# Patient Record
Sex: FEMALE | Race: WHITE | NOT HISPANIC OR LATINO | Employment: OTHER | URBAN - NONMETROPOLITAN AREA
[De-identification: names, ages, dates, MRNs, and addresses within clinical notes are randomized per-mention and may not be internally consistent; named-entity substitution may affect disease eponyms.]

---

## 2021-01-21 ENCOUNTER — IMMUNIZATION (OUTPATIENT)
Dept: VACCINE CLINIC | Facility: HOSPITAL | Age: 76
End: 2021-01-21

## 2021-01-21 PROCEDURE — 0001A: CPT | Performed by: NURSE PRACTITIONER

## 2021-01-21 PROCEDURE — 91300 HC SARSCOV02 VAC 30MCG/0.3ML IM: CPT | Performed by: NURSE PRACTITIONER

## 2021-02-11 ENCOUNTER — IMMUNIZATION (OUTPATIENT)
Dept: VACCINE CLINIC | Facility: HOSPITAL | Age: 76
End: 2021-02-11

## 2021-02-11 PROCEDURE — 0002A: CPT | Performed by: THORACIC SURGERY (CARDIOTHORACIC VASCULAR SURGERY)

## 2021-02-11 PROCEDURE — 91300 HC SARSCOV02 VAC 30MCG/0.3ML IM: CPT | Performed by: THORACIC SURGERY (CARDIOTHORACIC VASCULAR SURGERY)

## 2023-03-11 ENCOUNTER — HOSPITAL ENCOUNTER (OUTPATIENT)
Facility: HOSPITAL | Age: 78
Setting detail: OBSERVATION
Discharge: HOME | End: 2023-03-13
Attending: STUDENT IN AN ORGANIZED HEALTH CARE EDUCATION/TRAINING PROGRAM | Admitting: FAMILY MEDICINE
Payer: MEDICARE

## 2023-03-11 ENCOUNTER — APPOINTMENT (OUTPATIENT)
Dept: GENERAL RADIOLOGY | Facility: HOSPITAL | Age: 78
End: 2023-03-11
Payer: MEDICARE

## 2023-03-11 DIAGNOSIS — Z78.9 IMPAIRED MOBILITY AND ADLS: ICD-10-CM

## 2023-03-11 DIAGNOSIS — Z74.09 IMPAIRED MOBILITY AND ADLS: ICD-10-CM

## 2023-03-11 DIAGNOSIS — Z74.09 IMPAIRED FUNCTIONAL MOBILITY, BALANCE, GAIT, AND ENDURANCE: ICD-10-CM

## 2023-03-11 DIAGNOSIS — R07.9 CHEST PAIN, UNSPECIFIED TYPE: Primary | ICD-10-CM

## 2023-03-11 LAB
ALBUMIN SERPL-MCNC: 3.9 G/DL (ref 3.5–5.2)
ALBUMIN/GLOB SERPL: 1.3 G/DL
ALP SERPL-CCNC: 76 U/L (ref 39–117)
ALT SERPL W P-5'-P-CCNC: 14 U/L (ref 1–33)
ANION GAP SERPL CALCULATED.3IONS-SCNC: 9 MMOL/L (ref 5–15)
AST SERPL-CCNC: 17 U/L (ref 1–32)
BASOPHILS # BLD AUTO: 0.03 10*3/MM3 (ref 0–0.2)
BASOPHILS NFR BLD AUTO: 0.4 % (ref 0–1.5)
BILIRUB SERPL-MCNC: 0.3 MG/DL (ref 0–1.2)
BUN SERPL-MCNC: 18 MG/DL (ref 8–23)
BUN/CREAT SERPL: 31 (ref 7–25)
CALCIUM SPEC-SCNC: 10 MG/DL (ref 8.6–10.5)
CHLORIDE SERPL-SCNC: 102 MMOL/L (ref 98–107)
CO2 SERPL-SCNC: 27 MMOL/L (ref 22–29)
CREAT SERPL-MCNC: 0.58 MG/DL (ref 0.57–1)
DEPRECATED RDW RBC AUTO: 41.2 FL (ref 37–54)
EGFRCR SERPLBLD CKD-EPI 2021: 92.2 ML/MIN/1.73
EOSINOPHIL # BLD AUTO: 0.04 10*3/MM3 (ref 0–0.4)
EOSINOPHIL NFR BLD AUTO: 0.5 % (ref 0.3–6.2)
ERYTHROCYTE [DISTWIDTH] IN BLOOD BY AUTOMATED COUNT: 12.6 % (ref 12.3–15.4)
GEN 5 2HR TROPONIN T REFLEX: 24 NG/L
GLOBULIN UR ELPH-MCNC: 3 GM/DL
GLUCOSE SERPL-MCNC: 92 MG/DL (ref 65–99)
HCT VFR BLD AUTO: 41 % (ref 34–46.6)
HGB BLD-MCNC: 13.3 G/DL (ref 12–15.9)
HOLD SPECIMEN: NORMAL
IMM GRANULOCYTES # BLD AUTO: 0.04 10*3/MM3 (ref 0–0.05)
IMM GRANULOCYTES NFR BLD AUTO: 0.5 % (ref 0–0.5)
LYMPHOCYTES # BLD AUTO: 1.27 10*3/MM3 (ref 0.7–3.1)
LYMPHOCYTES NFR BLD AUTO: 15.5 % (ref 19.6–45.3)
MCH RBC QN AUTO: 29.3 PG (ref 26.6–33)
MCHC RBC AUTO-ENTMCNC: 32.4 G/DL (ref 31.5–35.7)
MCV RBC AUTO: 90.3 FL (ref 79–97)
MONOCYTES # BLD AUTO: 0.71 10*3/MM3 (ref 0.1–0.9)
MONOCYTES NFR BLD AUTO: 8.6 % (ref 5–12)
NEUTROPHILS NFR BLD AUTO: 6.12 10*3/MM3 (ref 1.7–7)
NEUTROPHILS NFR BLD AUTO: 74.5 % (ref 42.7–76)
NRBC BLD AUTO-RTO: 0 /100 WBC (ref 0–0.2)
NT-PROBNP SERPL-MCNC: 1082 PG/ML (ref 0–1800)
PLATELET # BLD AUTO: 262 10*3/MM3 (ref 140–450)
PMV BLD AUTO: 10.7 FL (ref 6–12)
POTASSIUM SERPL-SCNC: 4.2 MMOL/L (ref 3.5–5.2)
PROT SERPL-MCNC: 6.9 G/DL (ref 6–8.5)
RBC # BLD AUTO: 4.54 10*6/MM3 (ref 3.77–5.28)
SODIUM SERPL-SCNC: 138 MMOL/L (ref 136–145)
TROPONIN T DELTA: -1 NG/L
TROPONIN T SERPL HS-MCNC: 25 NG/L
TSH SERPL DL<=0.05 MIU/L-ACNC: 0.02 UIU/ML (ref 0.27–4.2)
WBC NRBC COR # BLD: 8.21 10*3/MM3 (ref 3.4–10.8)
WHOLE BLOOD HOLD COAG: NORMAL
WHOLE BLOOD HOLD SPECIMEN: NORMAL
WHOLE BLOOD HOLD SPECIMEN: NORMAL

## 2023-03-11 PROCEDURE — 99284 EMERGENCY DEPT VISIT MOD MDM: CPT

## 2023-03-11 PROCEDURE — 71045 X-RAY EXAM CHEST 1 VIEW: CPT

## 2023-03-11 PROCEDURE — 25010000002 HEPARIN (PORCINE) PER 1000 UNITS: Performed by: HOSPITALIST

## 2023-03-11 PROCEDURE — G0378 HOSPITAL OBSERVATION PER HR: HCPCS

## 2023-03-11 PROCEDURE — 93010 ELECTROCARDIOGRAM REPORT: CPT | Performed by: INTERNAL MEDICINE

## 2023-03-11 PROCEDURE — 36415 COLL VENOUS BLD VENIPUNCTURE: CPT | Performed by: HOSPITALIST

## 2023-03-11 PROCEDURE — 93005 ELECTROCARDIOGRAM TRACING: CPT | Performed by: STUDENT IN AN ORGANIZED HEALTH CARE EDUCATION/TRAINING PROGRAM

## 2023-03-11 PROCEDURE — 84484 ASSAY OF TROPONIN QUANT: CPT | Performed by: HOSPITALIST

## 2023-03-11 PROCEDURE — 85025 COMPLETE CBC W/AUTO DIFF WBC: CPT | Performed by: STUDENT IN AN ORGANIZED HEALTH CARE EDUCATION/TRAINING PROGRAM

## 2023-03-11 PROCEDURE — 25010000002 HYDRALAZINE PER 20 MG: Performed by: STUDENT IN AN ORGANIZED HEALTH CARE EDUCATION/TRAINING PROGRAM

## 2023-03-11 PROCEDURE — 93005 ELECTROCARDIOGRAM TRACING: CPT | Performed by: HOSPITALIST

## 2023-03-11 PROCEDURE — 96374 THER/PROPH/DIAG INJ IV PUSH: CPT

## 2023-03-11 PROCEDURE — 93005 ELECTROCARDIOGRAM TRACING: CPT

## 2023-03-11 PROCEDURE — 96372 THER/PROPH/DIAG INJ SC/IM: CPT

## 2023-03-11 PROCEDURE — 80053 COMPREHEN METABOLIC PANEL: CPT | Performed by: STUDENT IN AN ORGANIZED HEALTH CARE EDUCATION/TRAINING PROGRAM

## 2023-03-11 PROCEDURE — 63710000001 PREDNISONE PER 1 MG: Performed by: HOSPITALIST

## 2023-03-11 PROCEDURE — 84443 ASSAY THYROID STIM HORMONE: CPT | Performed by: HOSPITALIST

## 2023-03-11 PROCEDURE — 84484 ASSAY OF TROPONIN QUANT: CPT | Performed by: STUDENT IN AN ORGANIZED HEALTH CARE EDUCATION/TRAINING PROGRAM

## 2023-03-11 PROCEDURE — 83880 ASSAY OF NATRIURETIC PEPTIDE: CPT | Performed by: STUDENT IN AN ORGANIZED HEALTH CARE EDUCATION/TRAINING PROGRAM

## 2023-03-11 RX ORDER — BISACODYL 5 MG/1
5 TABLET, DELAYED RELEASE ORAL DAILY PRN
Status: DISCONTINUED | OUTPATIENT
Start: 2023-03-11 | End: 2023-03-13 | Stop reason: HOSPADM

## 2023-03-11 RX ORDER — DIPHENHYDRAMINE HCL 50 MG
50 CAPSULE ORAL
Status: COMPLETED | OUTPATIENT
Start: 2023-03-12 | End: 2023-03-12

## 2023-03-11 RX ORDER — CALCIUM CARBONATE 200(500)MG
2 TABLET,CHEWABLE ORAL 2 TIMES DAILY PRN
Status: DISCONTINUED | OUTPATIENT
Start: 2023-03-11 | End: 2023-03-13 | Stop reason: HOSPADM

## 2023-03-11 RX ORDER — POLYETHYLENE GLYCOL 3350 17 G/17G
17 POWDER, FOR SOLUTION ORAL DAILY PRN
Status: DISCONTINUED | OUTPATIENT
Start: 2023-03-11 | End: 2023-03-13 | Stop reason: HOSPADM

## 2023-03-11 RX ORDER — NALOXONE HCL 0.4 MG/ML
0.4 VIAL (ML) INJECTION
Status: DISCONTINUED | OUTPATIENT
Start: 2023-03-11 | End: 2023-03-13 | Stop reason: HOSPADM

## 2023-03-11 RX ORDER — LISINOPRIL 20 MG/1
20 TABLET ORAL
Status: DISCONTINUED | OUTPATIENT
Start: 2023-03-11 | End: 2023-03-13 | Stop reason: HOSPADM

## 2023-03-11 RX ORDER — DIPHENHYDRAMINE HYDROCHLORIDE 50 MG/ML
50 INJECTION INTRAMUSCULAR; INTRAVENOUS
Status: COMPLETED | OUTPATIENT
Start: 2023-03-12 | End: 2023-03-12

## 2023-03-11 RX ORDER — ASPIRIN 81 MG/1
81 TABLET ORAL DAILY
Status: DISCONTINUED | OUTPATIENT
Start: 2023-03-12 | End: 2023-03-13 | Stop reason: HOSPADM

## 2023-03-11 RX ORDER — LORAZEPAM 0.5 MG/1
0.5 TABLET ORAL EVERY 8 HOURS PRN
Status: DISCONTINUED | OUTPATIENT
Start: 2023-03-11 | End: 2023-03-13 | Stop reason: HOSPADM

## 2023-03-11 RX ORDER — MORPHINE SULFATE 2 MG/ML
1 INJECTION, SOLUTION INTRAMUSCULAR; INTRAVENOUS EVERY 4 HOURS PRN
Status: DISCONTINUED | OUTPATIENT
Start: 2023-03-11 | End: 2023-03-13 | Stop reason: HOSPADM

## 2023-03-11 RX ORDER — BISACODYL 10 MG
10 SUPPOSITORY, RECTAL RECTAL DAILY PRN
Status: DISCONTINUED | OUTPATIENT
Start: 2023-03-11 | End: 2023-03-13 | Stop reason: HOSPADM

## 2023-03-11 RX ORDER — ASPIRIN 81 MG/1
81 TABLET, CHEWABLE ORAL ONCE
Status: COMPLETED | OUTPATIENT
Start: 2023-03-11 | End: 2023-03-11

## 2023-03-11 RX ORDER — ONDANSETRON 2 MG/ML
4 INJECTION INTRAMUSCULAR; INTRAVENOUS EVERY 6 HOURS PRN
Status: DISCONTINUED | OUTPATIENT
Start: 2023-03-11 | End: 2023-03-13 | Stop reason: HOSPADM

## 2023-03-11 RX ORDER — SODIUM CHLORIDE 0.9 % (FLUSH) 0.9 %
10 SYRINGE (ML) INJECTION EVERY 12 HOURS SCHEDULED
Status: DISCONTINUED | OUTPATIENT
Start: 2023-03-11 | End: 2023-03-13 | Stop reason: HOSPADM

## 2023-03-11 RX ORDER — SODIUM CHLORIDE 9 MG/ML
40 INJECTION, SOLUTION INTRAVENOUS AS NEEDED
Status: DISCONTINUED | OUTPATIENT
Start: 2023-03-11 | End: 2023-03-13 | Stop reason: HOSPADM

## 2023-03-11 RX ORDER — HYDRALAZINE HYDROCHLORIDE 20 MG/ML
20 INJECTION INTRAMUSCULAR; INTRAVENOUS ONCE
Status: COMPLETED | OUTPATIENT
Start: 2023-03-11 | End: 2023-03-11

## 2023-03-11 RX ORDER — SODIUM CHLORIDE 9 MG/ML
75 INJECTION, SOLUTION INTRAVENOUS CONTINUOUS
Status: DISCONTINUED | OUTPATIENT
Start: 2023-03-11 | End: 2023-03-13 | Stop reason: HOSPADM

## 2023-03-11 RX ORDER — NITROGLYCERIN 0.4 MG/1
0.4 TABLET SUBLINGUAL
Status: DISCONTINUED | OUTPATIENT
Start: 2023-03-11 | End: 2023-03-13 | Stop reason: HOSPADM

## 2023-03-11 RX ORDER — METOPROLOL TARTRATE 50 MG/1
50 TABLET, FILM COATED ORAL EVERY 12 HOURS SCHEDULED
Status: DISCONTINUED | OUTPATIENT
Start: 2023-03-11 | End: 2023-03-13 | Stop reason: HOSPADM

## 2023-03-11 RX ORDER — CHOLECALCIFEROL (VITAMIN D3) 125 MCG
5 CAPSULE ORAL NIGHTLY PRN
Status: DISCONTINUED | OUTPATIENT
Start: 2023-03-11 | End: 2023-03-13 | Stop reason: HOSPADM

## 2023-03-11 RX ORDER — ONDANSETRON 4 MG/1
4 TABLET, FILM COATED ORAL EVERY 6 HOURS PRN
Status: DISCONTINUED | OUTPATIENT
Start: 2023-03-11 | End: 2023-03-13 | Stop reason: HOSPADM

## 2023-03-11 RX ORDER — SODIUM CHLORIDE 0.9 % (FLUSH) 0.9 %
10 SYRINGE (ML) INJECTION AS NEEDED
Status: DISCONTINUED | OUTPATIENT
Start: 2023-03-11 | End: 2023-03-13 | Stop reason: HOSPADM

## 2023-03-11 RX ORDER — LISINOPRIL 20 MG/1
20 TABLET ORAL DAILY
COMMUNITY
End: 2023-03-21 | Stop reason: SDUPTHER

## 2023-03-11 RX ORDER — HEPARIN SODIUM 5000 [USP'U]/ML
5000 INJECTION, SOLUTION INTRAVENOUS; SUBCUTANEOUS EVERY 8 HOURS SCHEDULED
Status: DISCONTINUED | OUTPATIENT
Start: 2023-03-11 | End: 2023-03-13 | Stop reason: HOSPADM

## 2023-03-11 RX ORDER — ACETAMINOPHEN 325 MG/1
650 TABLET ORAL EVERY 4 HOURS PRN
Status: DISCONTINUED | OUTPATIENT
Start: 2023-03-11 | End: 2023-03-13 | Stop reason: HOSPADM

## 2023-03-11 RX ADMIN — HEPARIN SODIUM 5000 UNITS: 5000 INJECTION INTRAVENOUS; SUBCUTANEOUS at 15:42

## 2023-03-11 RX ADMIN — PREDNISONE 50 MG: 20 TABLET ORAL at 20:06

## 2023-03-11 RX ADMIN — HYDRALAZINE HYDROCHLORIDE 20 MG: 20 INJECTION INTRAMUSCULAR; INTRAVENOUS at 12:47

## 2023-03-11 RX ADMIN — ASPIRIN 81 MG: 81 TABLET, CHEWABLE ORAL at 15:42

## 2023-03-11 RX ADMIN — LISINOPRIL 20 MG: 20 TABLET ORAL at 14:39

## 2023-03-11 RX ADMIN — SODIUM CHLORIDE 75 ML/HR: 9 INJECTION, SOLUTION INTRAVENOUS at 15:42

## 2023-03-11 RX ADMIN — Medication 10 ML: at 20:07

## 2023-03-11 RX ADMIN — METOPROLOL TARTRATE 50 MG: 50 TABLET, FILM COATED ORAL at 20:06

## 2023-03-11 NOTE — NURSING NOTE
Notified CT of iodine rash and they recommended the 13 hour premedication or to cancel test. Dr. Lange notified.

## 2023-03-11 NOTE — H&P
[facility] Medicine  HISTORY AND PHYSICAL      Date of Admission: 3/11/2023  Primary Care Physician: Provider, No Known    Subjective     Chief Complaint:  Chest pain    History of Present Illness  The patient she is a 77-year-old female admitted to the hospital via the ER.  The patient comes in on day of admission complaining of substernal chest pain and pressure.  She states the issue started last night around midnight became progressively worse throughout the night.  She took Tums which did not help.  She continued to have pain when she awoke in the morning took 2 aspirin and came to the ER for evaluation.  She states over the last few days that she has been having some heart palpitations on exertion.  Become short of breath as well this could be related to deconditioning however patient is unsure.  She has had no diaphoresis.  No radiation of the pain to the jaw or to the upper extremity.  She has no fevers chills no headache.  No nausea vomiting no GI or urinary complaints.    Review of Systems   Constitutional: Positive for fatigue. Negative for chills and fever.   HENT: Negative.  Negative for congestion.    Eyes: Negative.    Respiratory: Negative.  Negative for cough and shortness of breath.    Cardiovascular: Positive for chest pain. Negative for palpitations.   Gastrointestinal: Negative.  Negative for abdominal distention, abdominal pain, nausea and vomiting.   Endocrine: Negative.    Genitourinary: Negative.    Musculoskeletal: Negative.    Skin: Negative.    Neurological: Positive for weakness. Negative for dizziness.   Hematological: Negative.    Psychiatric/Behavioral: Negative.         Otherwise complete ROS reviewed and negative except as mentioned in the HPI.    Past Medical History: No past medical history on file.  Past Surgical History:No past surgical history on file.  Social History:  No known tobacco alcohol or drug use.    Family History:  "family history is not on file.   Patient denies any related family history    Allergies:  No Known Allergies    Medications:  No medications prior to admission.    I have utilized all available immediate resources to obtain, update, and review the patient's current medications.    Objective     Vital Signs: /77   Pulse 66   Temp 97.4 °F (36.3 °C) (Oral)   Resp 19   Ht 170.2 cm (67\")   Wt 93.4 kg (206 lb)   SpO2 97%   BMI 32.26 kg/m²   Physical Exam  Vitals and nursing note reviewed.   Constitutional:       Appearance: Normal appearance.   HENT:      Head: Normocephalic and atraumatic.      Right Ear: External ear normal.      Left Ear: External ear normal.      Nose: Nose normal.      Mouth/Throat:      Mouth: Mucous membranes are moist.      Pharynx: Oropharynx is clear.   Eyes:      General: No scleral icterus.     Extraocular Movements: Extraocular movements intact.      Conjunctiva/sclera: Conjunctivae normal.      Pupils: Pupils are equal, round, and reactive to light.   Cardiovascular:      Rate and Rhythm: Normal rate and regular rhythm.      Heart sounds: No murmur heard.  Pulmonary:      Effort: Pulmonary effort is normal.      Breath sounds: Normal breath sounds.   Abdominal:      General: Bowel sounds are normal.      Palpations: Abdomen is soft.   Musculoskeletal:         General: Normal range of motion.      Cervical back: Normal range of motion and neck supple.   Skin:     General: Skin is warm and dry.      Capillary Refill: Capillary refill takes less than 2 seconds.   Neurological:      General: No focal deficit present.      Mental Status: She is alert and oriented to person, place, and time.   Psychiatric:         Mood and Affect: Mood normal.         Behavior: Behavior normal.              Results Reviewed:  Lab Results (last 24 hours)     Procedure Component Value Units Date/Time    [place] Draw [[id number]] Collected: 03/11/23 1241    Specimen: Blood Updated: 03/11/23 1345    " Narrative:      The following orders were created for panel order [place] Draw.  Procedure                               Abnormality         Status                     ---------                               -----------         ------                     Green Top (Gel)[[id number]]                                  In process                 Lavender Top[[id number]]                                     Final result               Gold Top - SST[[id number]]                                   In process                 Light Blue Top[[id number]]                                   In process                   Please view results for these tests on the individual orders.    Lavender Top [[id number]] Collected: 03/11/23 1241    Specimen: Blood Updated: 03/11/23 1345     Extra Tube hold for add-on     Comment: Auto resulted       Comprehensive Metabolic Panel [[id number]]  (Abnormal) Collected: 03/11/23 1305    Specimen: Blood Updated: 03/11/23 1344     Glucose 92 mg/dL      BUN 18 mg/dL      Creatinine 0.58 mg/dL      Sodium 138 mmol/L      Potassium 4.2 mmol/L      Chloride 102 mmol/L      CO2 27.0 mmol/L      Calcium 10.0 mg/dL      Total Protein 6.9 g/dL      Albumin 3.9 g/dL      ALT (SGPT) 14 U/L      AST (SGOT) 17 U/L      Alkaline Phosphatase 76 U/L      Total Bilirubin 0.3 mg/dL      Globulin 3.0 gm/dL      A/G Ratio 1.3 g/dL      BUN/Creatinine Ratio 31.0     Anion Gap 9.0 mmol/L      eGFR 92.2 mL/min/1.73     Narrative:      GFR Normal >60  Chronic Kidney Disease <60  Kidney Failure <15    The GFR formula is only valid for adults with stable renal function between ages 18 and 70.    High Sensitivity Troponin T [[id number]]  (Abnormal) Collected: 03/11/23 1305    Specimen: Blood Updated: 03/11/23 1344     HS Troponin T 25 ng/L     Narrative:      High Sensitive Troponin T Reference Range:  <10.0 ng/L- Negative Female for AMI  <15.0 ng/L- Negative Male for AMI  >=10 - Abnormal Female indicating possible myocardial  injury.  >=15 - Abnormal Male indicating possible myocardial injury.   Clinicians would have to utilize clinical acumen, EKG, Troponin, and serial changes to determine if it is an Acute Myocardial Infarction or myocardial injury due to an underlying chronic condition.         BNP [[id number]]  (Normal) Collected: 03/11/23 1305    Specimen: Blood Updated: 03/11/23 1342     proBNP 1,082.0 pg/mL     Narrative:      Among patients with dyspnea, NT-proBNP is highly sensitive for the detection of acute congestive heart failure. In addition NT-proBNP of <300 pg/ml effectively rules out acute congestive heart failure with 99% negative predictive value.      Extra Tubes [[id number]] Collected: 03/11/23 1305    Specimen: Blood, Venous Line Updated: 03/11/23 1324    Narrative:      The following orders were created for panel order Extra Tubes.  Procedure                               Abnormality         Status                     ---------                               -----------         ------                     Lavender Top[[id number]]                                     In process                 [place] Top[[id number]]                                         In process                   Please view results for these tests on the individual orders.    Lavender Top [[id number]] Collected: 03/11/23 1305    Specimen: Blood Updated: 03/11/23 1324    Gray Top [[id number]] Collected: 03/11/23 1305    Specimen: Blood Updated: 03/11/23 1324    Green Top (Gel) [[id number]] Collected: 03/11/23 1305    Specimen: Blood Updated: 03/11/23 1321    Gold Top - SST [[id number]] Collected: 03/11/23 1305    Specimen: Blood Updated: 03/11/23 1321    Light Blue Top [[id number]] Collected: 03/11/23 1305    Specimen: Blood Updated: 03/11/23 1321    CBC & Differential [[id number]]  (Abnormal) Collected: 03/11/23 1241    Specimen: Blood Updated: 03/11/23 1249    Narrative:      The following orders were created for panel order CBC & Differential.  Procedure                                Abnormality         Status                     ---------                               -----------         ------                     CBC Auto Differential[[id number]]        Abnormal            Final result                 Please view results for these tests on the individual orders.    CBC Auto Differential [[id number]]  (Abnormal) Collected: 03/11/23 1241    Specimen: Blood Updated: 03/11/23 1249     WBC 8.21 10*3/mm3      RBC 4.54 10*6/mm3      Hemoglobin 13.3 g/dL      Hematocrit 41.0 %      MCV 90.3 fL      MCH 29.3 pg      MCHC 32.4 g/dL      RDW 12.6 %      RDW-SD 41.2 fl      MPV 10.7 fL      Platelets 262 10*3/mm3      Neutrophil % 74.5 %      Lymphocyte % 15.5 %      Monocyte % 8.6 %      Eosinophil % 0.5 %      Basophil % 0.4 %      Immature Grans % 0.5 %      Neutrophils, Absolute 6.12 10*3/mm3      Lymphocytes, Absolute 1.27 10*3/mm3      Monocytes, Absolute 0.71 10*3/mm3      Eosinophils, Absolute 0.04 10*3/mm3      Basophils, Absolute 0.03 10*3/mm3      Immature Grans, Absolute 0.04 10*3/mm3      nRBC 0.0 /100 WBC         Imaging Results (Last 24 Hours)     Procedure Component Value Units Date/Time    XR Chest 1 View [[id number]] Collected: 03/11/23 1239     Updated: 03/11/23 1328    Narrative:      EXAM: XR CHEST 1 VIEW    HISTORY: Chest Pain triage protocol  Chest Pain Triage Protocol    COMPARISON:    TECHNIQUE:   Portable view of the chest.    FINDINGS:   The lungs are well aerated. There is no pleural effusion. The  heart size is prominent. The mediastinal contours are within  normal limits. .  No evidence of focal consolidation. Unremarkable bronchovascular  markings. Unremarkable bilateral hemidiaphragms.  Unremarkable visualized osseous structures.      Impression:      Cardiomegaly.  No gross pneumonia or CHF.          Electronically signed by:  Medardo Cramer MD  3/11/2023 1:26 PM [facility]  Workstation: [id number]        I have personally reviewed and interpreted the  radiology studies and ECG obtained at time of admission.     Assessment / Plan     Assessment:   Active Hospital Problems    Diagnosis    • **Chest pain, unspecified type      Impression/plan  Chest pain  Follow-up CT of the coronary arteries ordered stat  Troponin is 25 we will follow repeat  Paced the patient on beta-blocker, aspirin and as needed nitroglycerin  Diet cardiac  We will place cardiology consult if CT scan is positive.  We will check lipid panel and TSH    Uncontrolled hypertension  Patient takes lisinopril at home kidney function is good we will continue BUN is 18 creatinine 0.5 and GFR is 92.2  We will add beta-blocker Lopressor 50 mg twice a day  Hydralazine as needed for uncontrolled pressure.    Morbid obesity  Continue nutritional support  BMI is 32.2    Deconditioning  PT OT eval and treat    DVT prophylaxis heparin    CODE STATUS full code    Medical Decision Making  Number and Complexity of problems: 3 complex problems  Differential Diagnosis: Acute coronary syndrome versus non-ST segment elevation MI     MDM Data  External documents reviewed: Hospital chart  My EKG interpretation: EKG completed 3/11/2023  Normal sinus rhythm  Normal ECG  My plain film interpretation:  Chest x-ray completed 3/11/2023 showing cardiomegaly otherwise no acute cardiopulmonary process  Tests considered but not ordered: None     Decision rules/scores evaluated (example [id number], [place], etc):      Discussed with: The patient and her  and ER physician and agree with current treatment plan     Treatment Plan  As above    Care Planning  Shared decision making: Patient updated on current status and informed of proposed care plan; is in agreement with plan  Code status and discussions: Full code    Disposition  Social Determinants of Health that impact treatment or disposition: N/A  I expect the patient to be discharged to home in 1 to 2 days    I confirmed that the patient's Advance Care Plan is present, code  status is documented, or surrogate decision maker is listed in the patient's medical record.       The patient's surrogate decision maker is     I discussed my findings and recommendations with the patient and her  who is at bedside.    Estimated length of stay is 24 to 48 hours.     The patient was seen and examined by me on 3/11/2023 at 1:50 PM    Electronically signed by Ziggy Mckeon DO, 03/11/23, 14:07 CST.

## 2023-03-11 NOTE — ED NOTES
"Nursing report ED to floor  Larisa Vazquez  77 y.o.  female    HPI:   Chief Complaint   Patient presents with    Chest Pain       Admitting doctor:   Ziggy Mckeon DO    Consulting provider(s):  Consults       No orders found from 2/10/2023 to 3/12/2023.             Admitting diagnosis:   The encounter diagnosis was Chest pain, unspecified type.    Code status:   Current Code Status       Date Active Code Status Order ID Comments User Context       3/11/2023 1407 CPR (Attempt to Resuscitate) [id number]  Ziggy Mckeon DO ED        Question Answer    Code Status (Patient has no pulse and is not breathing) CPR (Attempt to Resuscitate)    Medical Interventions (Patient has pulse or is breathing) Full Support    Level Of Support Discussed With Patient     Next of Kin (If No Surrogate)                    Allergies:   Patient has no known allergies.    Intake and Output  No intake or output data in the 24 hours ending 03/11/23 1414    Weight:       03/11/23  1205   Weight: 93.4 kg (206 lb)       Most recent vitals:   Vitals:    03/11/23 1205 03/11/23 1245 03/11/23 1412   BP: (!) 209/100 180/77 164/70   BP Location: Right arm     Patient Position: Sitting     Pulse: 91 66 93   Resp: 20 19 18   Temp: 97.4 °F (36.3 °C)     TempSrc: Oral     SpO2: 95% 97% 99%   Weight: 93.4 kg (206 lb)     Height: 170.2 cm (67\")       Oxygen Therapy: No    Active LDAs/IV Access:   Lines, Drains & Airways       Active LDAs       Name Placement date Placement time Site Days    Peripheral IV 03/11/23 1241 Posterior;Right Wrist 03/11/23  1241  Wrist  less than 1                    Labs (abnormal labs have a star):   Labs Reviewed   TROPONIN - Abnormal; Notable for the following components:       Result Value    HS Troponin T 25 (*)     All other components within normal limits    Narrative:     High Sensitive Troponin T Reference Range:  <10.0 ng/L- Negative Female for AMI  <15.0 ng/L- Negative Male for AMI  >=10 - Abnormal Female " indicating possible myocardial injury.  >=15 - Abnormal Male indicating possible myocardial injury.   Clinicians would have to utilize clinical acumen, EKG, Troponin, and serial changes to determine if it is an Acute Myocardial Infarction or myocardial injury due to an underlying chronic condition.        COMPREHENSIVE METABOLIC PANEL - Abnormal; Notable for the following components:    BUN/Creatinine Ratio 31.0 (*)     All other components within normal limits    Narrative:     GFR Normal >60  Chronic Kidney Disease <60  Kidney Failure <15    The GFR formula is only valid for adults with stable renal function between ages 18 and 70.   CBC WITH AUTO DIFFERENTIAL - Abnormal; Notable for the following components:    Lymphocyte % 15.5 (*)     All other components within normal limits   BNP (IN-HOUSE) - Normal    Narrative:     Among patients with dyspnea, NT-proBNP is highly sensitive for the detection of acute congestive heart failure. In addition NT-proBNP of <300 pg/ml effectively rules out acute congestive heart failure with 99% negative predictive value.     [place] DRAW    Narrative:     The following orders were created for panel order [place] Draw.  Procedure                               Abnormality         Status                     ---------                               -----------         ------                     Green Top (Gel)[[id number]]                                  In process                 Lavender Top[[id number]]                                     Final result               Gold Top - SST[[id number]]                                   In process                 Light Blue Top[[id number]]                                   In process                   Please view results for these tests on the individual orders.   HIGH SENSITIVITIY TROPONIN T 2HR   CBC AND DIFFERENTIAL    Narrative:     The following orders were created for panel order CBC & Differential.  Procedure                                Abnormality         Status                     ---------                               -----------         ------                     CBC Auto Differential[[id number]]        Abnormal            Final result                 Please view results for these tests on the individual orders.   LAVENDER TOP   GREEN TOP   GOLD TOP - SST   LIGHT BLUE TOP   EXTRA TUBES    Narrative:     The following orders were created for panel order Extra Tubes.  Procedure                               Abnormality         Status                     ---------                               -----------         ------                     Lavender Top[[id number]]                                     In process                 [place] Top[[id number]]                                         In process                   Please view results for these tests on the individual orders.   LAVENDER TOP   GRAY TOP       Meds given in ED:   Medications   sodium chloride 0.9 % flush 10 mL (has no administration in time range)   nitroglycerin (NITROSTAT) SL tablet 0.4 mg (has no administration in time range)   hydrALAZINE (APRESOLINE) injection 20 mg (20 mg Intravenous Given 3/11/23 1247)           NIH Stroke Scale:       Isolation/Infection(s):  No active isolations   No active infections     COVID Testing  Collected No  Resulted NA     Nursing report ED to floor:  Mental status: AOx4  Ambulatory status: Self  Precautions: None    ED nurse phone extentsinp- [phone]

## 2023-03-11 NOTE — ED NOTES
Pt presenting from [facility]. C/o midsternal chest pressure that began last night that is radiating to her left arm.

## 2023-03-11 NOTE — ED PROVIDER NOTES
"Subjective   History of Present Illness  77-year-old female with history of hypertension comes to the ER with substernal chest pressure since yesterday.  Slow but worse with exertion and better with rest.  She denies other symptoms.  She took aspirin.  Pain has gotten somewhat better.    History provided by:  Patient   used: No        Review of Systems   Constitutional: Negative for chills and fever.   HENT: Negative for drooling.    Eyes: Negative for redness.   Respiratory: Negative for shortness of breath.    Cardiovascular: Positive for chest pain.   Gastrointestinal: Negative for abdominal pain, nausea and vomiting.   Genitourinary: Negative for flank pain.   Skin: Negative for color change.   Neurological: Negative for seizures.   Psychiatric/Behavioral: Negative for confusion.       No past medical history on file.    No Known Allergies    No past surgical history on file.    No family history on file.    Social History     Socioeconomic History   • Marital status:            Objective    Vitals:    03/11/23 1205 03/11/23 1245   BP: (!) 209/100 180/77   BP Location: Right arm    Patient Position: Sitting    Pulse: 91 66   Resp: 20 19   Temp: 97.4 °F (36.3 °C)    TempSrc: Oral    SpO2: 95% 97%   Weight: 93.4 kg (206 lb)    Height: 170.2 cm (67\")        Physical Exam  Vitals and nursing note reviewed.   Constitutional:       General: She is not in acute distress.     Appearance: She is well-developed. She is obese. She is not ill-appearing, toxic-appearing or diaphoretic.   Eyes:      Conjunctiva/sclera: Conjunctivae normal.   Pulmonary:      Effort: Pulmonary effort is normal. No accessory muscle usage or respiratory distress.      Breath sounds: Normal breath sounds.   Chest:      Chest wall: No tenderness.   Abdominal:      General: Bowel sounds are normal.      Palpations: Abdomen is soft.      Tenderness: There is no abdominal tenderness (deep palpation). There is no guarding or " rebound.   Skin:     General: Skin is warm and dry.      Capillary Refill: Capillary refill takes less than 2 seconds.   Neurological:      Mental Status: She is alert and oriented to person, place, and time.         ECG 12 Lead      Date/Time: 3/11/2023 2:00 PM  Performed by: Giuliano Benson MD  Authorized by: Giuliano Benson MD   Interpreted by physician  Rhythm: sinus rhythm  Rate: normal  QRS axis: normal  ST Segments: ST segments normal  T Waves: T waves normal  Clinical impression: normal ECG                 ED Course      Results for orders placed or performed during the hospital encounter of 03/11/23   High Sensitivity Troponin T    Specimen: Blood   Result Value Ref Range    HS Troponin T 25 (H) <10 ng/L   Comprehensive Metabolic Panel    Specimen: Blood   Result Value Ref Range    Glucose 92 65 - 99 mg/dL    BUN 18 8 - 23 mg/dL    Creatinine 0.58 0.57 - 1.00 mg/dL    Sodium 138 136 - 145 mmol/L    Potassium 4.2 3.5 - 5.2 mmol/L    Chloride 102 98 - 107 mmol/L    CO2 27.0 22.0 - 29.0 mmol/L    Calcium 10.0 8.6 - 10.5 mg/dL    Total Protein 6.9 6.0 - 8.5 g/dL    Albumin 3.9 3.5 - 5.2 g/dL    ALT (SGPT) 14 1 - 33 U/L    AST (SGOT) 17 1 - 32 U/L    Alkaline Phosphatase 76 39 - 117 U/L    Total Bilirubin 0.3 0.0 - 1.2 mg/dL    Globulin 3.0 gm/dL    A/G Ratio 1.3 g/dL    BUN/Creatinine Ratio 31.0 (H) 7.0 - 25.0    Anion Gap 9.0 5.0 - 15.0 mmol/L    eGFR 92.2 >60.0 mL/min/1.73   BNP    Specimen: Blood   Result Value Ref Range    proBNP 1,082.0 0.0 - 1,800.0 pg/mL   CBC Auto Differential    Specimen: Blood   Result Value Ref Range    WBC 8.21 3.40 - 10.80 10*3/mm3    RBC 4.54 3.77 - 5.28 10*6/mm3    Hemoglobin 13.3 12.0 - 15.9 g/dL    Hematocrit 41.0 34.0 - 46.6 %    MCV 90.3 79.0 - 97.0 fL    MCH 29.3 26.6 - 33.0 pg    MCHC 32.4 31.5 - 35.7 g/dL    RDW 12.6 12.3 - 15.4 %    RDW-SD 41.2 37.0 - 54.0 fl    MPV 10.7 6.0 - 12.0 fL    Platelets 262 140 - 450 10*3/mm3    Neutrophil % 74.5 42.7 - 76.0 %    Lymphocyte  % 15.5 (L) 19.6 - 45.3 %    Monocyte % 8.6 5.0 - 12.0 %    Eosinophil % 0.5 0.3 - 6.2 %    Basophil % 0.4 0.0 - 1.5 %    Immature Grans % 0.5 0.0 - 0.5 %    Neutrophils, Absolute 6.12 1.70 - 7.00 10*3/mm3    Lymphocytes, Absolute 1.27 0.70 - 3.10 10*3/mm3    Monocytes, Absolute 0.71 0.10 - 0.90 10*3/mm3    Eosinophils, Absolute 0.04 0.00 - 0.40 10*3/mm3    Basophils, Absolute 0.03 0.00 - 0.20 10*3/mm3    Immature Grans, Absolute 0.04 0.00 - 0.05 10*3/mm3    nRBC 0.0 0.0 - 0.2 /100 WBC   ECG 12 Lead Chest Pain   Result Value Ref Range    QT Interval 356 ms    QTC Interval 386 ms   Lavender Top   Result Value Ref Range    Extra Tube hold for add-on      XR Chest 1 View   Final Result   Cardiomegaly.   No gross pneumonia or CHF.               Electronically signed by:  Medardo Cramer MD  3/11/2023 1:26 PM CST   Workstation: [id number]             HEART Score for Major Cardiac Events - MDCalc  5 points -> Moderate Score (4-6 points) Risk of MACE of 12-16.6%.  If troponin is positive, many experts recommend further workup and admission even with a low HEART Score.    Medical Decision Making  Vital signs are stable, afebrile.  Labs remarkable for an elevated troponin.  EKG is sinus rhythm no acute ischemic changes.  Chest x-ray shows large heart with no overt acute findings.  Patient does have an elevated heart score.  Spoke with the on-call hospitalist who agrees to admit.    Chest pain, unspecified type: complicated acute illness or injury with systemic symptoms  Amount and/or Complexity of Data Reviewed  Labs: ordered.  Radiology: ordered.  ECG/medicine tests: ordered and independent interpretation performed.      Risk  Prescription drug management.  Decision regarding hospitalization.          Final diagnoses:   Chest pain, unspecified type       ED Disposition  ED Disposition     ED Disposition   Decision to Admit    Condition   --    Comment   Level of Care: Telemetry [5]   Diagnosis: Chest pain, unspecified type  [[id number]]   Admitting Physician: BEBETO MITCHELL [[id number]]   Attending Physician: BEBETO MITCHELL [[id number]]               No follow-up provider specified.       Medication List      No changes were made to your prescriptions during this visit.          Giuliano Benson MD  03/11/23 1409

## 2023-03-11 NOTE — PLAN OF CARE
Goal Outcome Evaluation:  Plan of Care Reviewed With: patient            No chest pain or soa at this time. Iodine allergy, 13 hour premedication ordered

## 2023-03-12 ENCOUNTER — APPOINTMENT (OUTPATIENT)
Dept: CT IMAGING | Facility: HOSPITAL | Age: 78
End: 2023-03-12
Payer: MEDICARE

## 2023-03-12 LAB
ALBUMIN SERPL-MCNC: 3.8 G/DL (ref 3.5–5.2)
ALBUMIN/GLOB SERPL: 1.4 G/DL
ALP SERPL-CCNC: 72 U/L (ref 39–117)
ALT SERPL W P-5'-P-CCNC: 24 U/L (ref 1–33)
ANION GAP SERPL CALCULATED.3IONS-SCNC: 11 MMOL/L (ref 5–15)
AST SERPL-CCNC: 24 U/L (ref 1–32)
BASOPHILS # BLD AUTO: 0.02 10*3/MM3 (ref 0–0.2)
BASOPHILS NFR BLD AUTO: 0.3 % (ref 0–1.5)
BILIRUB SERPL-MCNC: 0.3 MG/DL (ref 0–1.2)
BUN SERPL-MCNC: 20 MG/DL (ref 8–23)
BUN/CREAT SERPL: 37.7 (ref 7–25)
CALCIUM SPEC-SCNC: 9 MG/DL (ref 8.6–10.5)
CHLORIDE SERPL-SCNC: 103 MMOL/L (ref 98–107)
CHOLEST SERPL-MCNC: 167 MG/DL (ref 0–200)
CO2 SERPL-SCNC: 23 MMOL/L (ref 22–29)
CREAT SERPL-MCNC: 0.53 MG/DL (ref 0.57–1)
DEPRECATED RDW RBC AUTO: 40.7 FL (ref 37–54)
EGFRCR SERPLBLD CKD-EPI 2021: 94.2 ML/MIN/1.73
EOSINOPHIL # BLD AUTO: 0 10*3/MM3 (ref 0–0.4)
EOSINOPHIL NFR BLD AUTO: 0 % (ref 0.3–6.2)
ERYTHROCYTE [DISTWIDTH] IN BLOOD BY AUTOMATED COUNT: 12.4 % (ref 12.3–15.4)
GLOBULIN UR ELPH-MCNC: 2.8 GM/DL
GLUCOSE SERPL-MCNC: 154 MG/DL (ref 65–99)
HCT VFR BLD AUTO: 38.4 % (ref 34–46.6)
HDLC SERPL-MCNC: 64 MG/DL (ref 40–60)
HGB BLD-MCNC: 12.5 G/DL (ref 12–15.9)
IMM GRANULOCYTES # BLD AUTO: 0.02 10*3/MM3 (ref 0–0.05)
IMM GRANULOCYTES NFR BLD AUTO: 0.3 % (ref 0–0.5)
LDLC SERPL CALC-MCNC: 90 MG/DL (ref 0–100)
LDLC/HDLC SERPL: 1.4 {RATIO}
LYMPHOCYTES # BLD AUTO: 0.75 10*3/MM3 (ref 0.7–3.1)
LYMPHOCYTES NFR BLD AUTO: 10.1 % (ref 19.6–45.3)
MCH RBC QN AUTO: 29.3 PG (ref 26.6–33)
MCHC RBC AUTO-ENTMCNC: 32.6 G/DL (ref 31.5–35.7)
MCV RBC AUTO: 89.9 FL (ref 79–97)
MONOCYTES # BLD AUTO: 0.06 10*3/MM3 (ref 0.1–0.9)
MONOCYTES NFR BLD AUTO: 0.8 % (ref 5–12)
NEUTROPHILS NFR BLD AUTO: 6.58 10*3/MM3 (ref 1.7–7)
NEUTROPHILS NFR BLD AUTO: 88.5 % (ref 42.7–76)
NRBC BLD AUTO-RTO: 0 /100 WBC (ref 0–0.2)
PLATELET # BLD AUTO: 240 10*3/MM3 (ref 140–450)
PMV BLD AUTO: 10.6 FL (ref 6–12)
POTASSIUM SERPL-SCNC: 4.5 MMOL/L (ref 3.5–5.2)
PROT SERPL-MCNC: 6.6 G/DL (ref 6–8.5)
RBC # BLD AUTO: 4.27 10*6/MM3 (ref 3.77–5.28)
SODIUM SERPL-SCNC: 137 MMOL/L (ref 136–145)
TRIGL SERPL-MCNC: 66 MG/DL (ref 0–150)
VLDLC SERPL-MCNC: 13 MG/DL (ref 5–40)
WBC NRBC COR # BLD: 7.43 10*3/MM3 (ref 3.4–10.8)

## 2023-03-12 PROCEDURE — 80061 LIPID PANEL: CPT | Performed by: HOSPITALIST

## 2023-03-12 PROCEDURE — 63710000001 DIPHENHYDRAMINE PER 50 MG: Performed by: HOSPITALIST

## 2023-03-12 PROCEDURE — 93005 ELECTROCARDIOGRAM TRACING: CPT | Performed by: INTERNAL MEDICINE

## 2023-03-12 PROCEDURE — 25010000002 HEPARIN (PORCINE) PER 1000 UNITS: Performed by: HOSPITALIST

## 2023-03-12 PROCEDURE — 80053 COMPREHEN METABOLIC PANEL: CPT | Performed by: HOSPITALIST

## 2023-03-12 PROCEDURE — 63710000001 PREDNISONE PER 1 MG: Performed by: HOSPITALIST

## 2023-03-12 PROCEDURE — G0378 HOSPITAL OBSERVATION PER HR: HCPCS

## 2023-03-12 PROCEDURE — 85025 COMPLETE CBC W/AUTO DIFF WBC: CPT | Performed by: HOSPITALIST

## 2023-03-12 PROCEDURE — 63710000001 PREDNISONE PER 5 MG: Performed by: HOSPITALIST

## 2023-03-12 PROCEDURE — 96372 THER/PROPH/DIAG INJ SC/IM: CPT

## 2023-03-12 RX ORDER — AMLODIPINE BESYLATE 10 MG/1
10 TABLET ORAL
Status: DISCONTINUED | OUTPATIENT
Start: 2023-03-12 | End: 2023-03-13 | Stop reason: HOSPADM

## 2023-03-12 RX ADMIN — PREDNISONE 50 MG: 20 TABLET ORAL at 07:26

## 2023-03-12 RX ADMIN — DIPHENHYDRAMINE HYDROCHLORIDE 50 MG: 50 CAPSULE ORAL at 07:26

## 2023-03-12 RX ADMIN — PREDNISONE 50 MG: 20 TABLET ORAL at 01:44

## 2023-03-12 RX ADMIN — HEPARIN SODIUM 5000 UNITS: 5000 INJECTION INTRAVENOUS; SUBCUTANEOUS at 05:37

## 2023-03-12 RX ADMIN — AMLODIPINE BESYLATE 10 MG: 10 TABLET ORAL at 12:58

## 2023-03-12 RX ADMIN — HEPARIN SODIUM 5000 UNITS: 5000 INJECTION INTRAVENOUS; SUBCUTANEOUS at 23:06

## 2023-03-12 RX ADMIN — SODIUM CHLORIDE 75 ML/HR: 9 INJECTION, SOLUTION INTRAVENOUS at 06:01

## 2023-03-12 RX ADMIN — SODIUM CHLORIDE 75 ML/HR: 9 INJECTION, SOLUTION INTRAVENOUS at 20:17

## 2023-03-12 RX ADMIN — METOPROLOL TARTRATE 2.5 MG: 5 INJECTION INTRAVENOUS at 08:01

## 2023-03-12 RX ADMIN — METOPROLOL TARTRATE 5 MG: 5 INJECTION INTRAVENOUS at 08:42

## 2023-03-12 RX ADMIN — METOPROLOL TARTRATE 50 MG: 50 TABLET, FILM COATED ORAL at 20:14

## 2023-03-12 RX ADMIN — Medication 81 MG: at 08:01

## 2023-03-12 RX ADMIN — METOPROLOL TARTRATE 50 MG: 50 TABLET, FILM COATED ORAL at 08:01

## 2023-03-12 RX ADMIN — HEPARIN SODIUM 5000 UNITS: 5000 INJECTION INTRAVENOUS; SUBCUTANEOUS at 14:00

## 2023-03-12 RX ADMIN — LISINOPRIL 20 MG: 20 TABLET ORAL at 08:01

## 2023-03-12 RX ADMIN — METOPROLOL TARTRATE 5 MG: 5 INJECTION INTRAVENOUS at 08:20

## 2023-03-12 NOTE — CONSULTS
Cardiology Consultation Note.        Patient Name: Larisa Vazquez  Age/Sex: 77 y.o. female  : 1945  MRN: [id number]    Date of consultation: 3/12/2023  Consulting Physician: aMc Aquino MD  Primary care Physician: Provider, No Known  Requesting Physician:   Ziggy Mckeon DO  Reason for consultation: Chest pain      Subjective:       Chief Complaint: Chest pain    History of Present Illness:  Larisa Vazquez is a 77 y.o. female     Body mass index is 32.16 kg/m².  With a past medical history significant for arterial hypertension, hypertensive heart disease, obesity with a body mass index of 32 who presented to the hospital with symptoms of chest pain.  Patient 2 days ago had symptoms of substernal chest discomfort but had not seek any medical attention.  Patient the following day had recurrent symptoms of discomfort with some radiation to the left arm.  Patient initially attributed to indigestion.  Patient on further questioning denies previous cardiac evaluation.    Patient complains of having symptoms of palpitation associated with symptoms of chest pain.  Patient on further questioning denies excessive intake of coffee or caffeinated beverage.  Patient denies any symptoms of lightheaded dizziness near syncope.  Patient denies any associated diaphoresis.    Patient initial resting electrocardiogram did not show any acute ST-T wave changes.    Patient was evaluated by the hospitalist and patient was recommended to undergo a CTA of the coronaries.  Patient was unable to undergo CTA of the coronaries secondary to the patient elevated heart rate.    Patient 10 point review of system except for what stated in the history of present illness and negative.        Concurrent Medical History:  1.  Chest pain symptomatic palpitation.  2.  Arterial hypertension.  3.  Hypertensive heart disease.  4.  Obesity.      Past Surgical History:  1.  Hysterectomy        Family History: No history of premature  atherosclerotic coronary artery      Social History: No history of tobacco alcohol intake patient is a homemaker.       Cardiac Risk Factors:  1.  Postmenopausal.  2.  Arterial hypertension.  3.  Obesity.      Allergies:  Allergies   Allergen Reactions   • Amoxicillin Rash   • Bactrim [Sulfamethoxazole-Trimethoprim] Rash   • Iodine Rash       Medication:  Medications Prior to Admission   Medication Sig Dispense Refill Last Dose   • lisinopril (PRINIVIL,ZESTRIL) 20 MG tablet Take 1 tablet by mouth Daily.   3/11/2023           Review of Systems:       Constitutional:  Denies recent weight loss, weight gain, fever or chills, no change in exercise tolerance.     HENT:  Denies any hearing loss, epistaxis, hoarseness, or difficulty speaking.     Eyes: Wears eyeglasses or contact lenses     Respiratory:  Denies dyspnea with exertion,no cough, wheezing, or hemoptysis.     Cardiovascular: Positive for chest pain and palpitation.  Negative for  orthopnea, PND, peripheral edema, syncope, or claudication.     Gastrointestinal:  Denies change in bowel habits, dyspepsia, ulcer disease, hematochezia, or melena.  No nausea, no vomiting, no hematemesis, no diarrhea or constipation.    Endocrine: Negative for cold intolerance, heat intolerance, polydipsia, polyphagia or polyuria. Denies any history of weight change or unintended weight loss.    Genitourinary: Negative for hematuria.  No frequent urination or nocturia.      Musculoskeletal: Denies any history of arthritic symptoms or back problems .  No joint pain, joint stiffness, joint swelling, muscle pain, muscle weakness or neck pain.    Skin:  Denies any change in hair or nails, rashes, or skin lesions.     Allergic/Immunologic: Negative.  Negative for environmental allergies, food allergies or immunocompromised state.     Neurological:  Denies any history of recurrent headaches, strokes, TIA, or seizure disorder.     Hematological: Denies excessive bleeding, easy bruising,  fatigue, lymphadenopathy or petechiae or any bleeding disorders.     Psychiatric/Behavioral: Denies any history of depression, substance abuse, or change in cognitive function. Denies any psychomotor reaction or tangential thought.  No depression, homicidal ideations or suicidal ideations.          Objective:     Objective:  Temp:  [97 °F (36.1 °C)-99.3 °F (37.4 °C)] 97.9 °F (36.6 °C)  Heart Rate:  [52-93] 55  Resp:  [18-19] 18  BP: (127-180)/(58-78) 160/77      Body mass index is 32.16 kg/m².           Physical Exam:   General Appearance:    Alert, oriented, cooperative, in no acute distress.   Head:    Normocephalic, atraumatic, without obvious abnormality.   Eyes:           TRIPP.  Lids and lashes normal, conjunctivae and sclerae normal, no icterus, no pallor.   Ears:    Ears appear intact with no abnormalities noted.   Throat:   Mucous membranes pink and moist.   Neck:   Supple, trachea midline, no carotid bruit, no organomegaly or JVD.   Lungs:     Clear to auscultation and percussion, respirations regular, even and unlabored. No wheezes, rales or rhonchi.    Heart:    Regular rhythm and normal rate, normal S1 and S2, no murmur, no gallop, no rub, no click.   Abdomen:     Soft, nontender, nondistended, no guarding, no rebound tenderness, normal bowel sounds in all four quadrants, no masses, liver and spleen nonpalpable.   Genitalia:    Deferred.   Extremities:   Moves all extremities well, no edema, no cyanosis, no  redness, no clubbing.   Pulses:   Pulses palpable and equal bilaterally.   Skin:   Moist and warm. No bleeding, bruising or rash.   Neurologic/Psychiatric:   Alert and oriented to person, place, and time.  Motor, power and tone in upper and lower extremities are grossly intact. No focal neurological deficits. Normal cognitive function. No psychomotor reaction or tangential thought. No depression, homicidal ideations and suicidal ideations.       Medication Review:   Current Facility-Administered  Medications   Medication Dose Route Frequency Provider Last Rate Last Admin   • acetaminophen (TYLENOL) tablet 650 mg  650 mg Oral Q4H PRN Linda, Ziggy R, DO       • amLODIPine (NORVASC) tablet 10 mg  10 mg Oral Q24H Linda, Ziggy R, DO       • aspirin EC tablet 81 mg  81 mg Oral Daily Linda, Ziggy R, DO   81 mg at 03/12/23 0801   • polyethylene glycol (MIRALAX) packet 17 g  17 g Oral Daily PRN Linda, Ziggy R, DO        And   • bisacodyl (DULCOLAX) EC tablet 5 mg  5 mg Oral Daily PRN Linda, Ziggy R, DO        And   • bisacodyl (DULCOLAX) suppository 10 mg  10 mg Rectal Daily PRN Linda, Ziggy R, DO       • calcium carbonate (TUMS) chewable tablet 500 mg (200 mg elemental)  2 tablet Oral BID PRN Linda, Ziggy R, DO       • heparin (porcine) 5000 UNIT/ML injection 5,000 Units  5,000 Units Subcutaneous Q8H Linda, Ziggy R, DO   5,000 Units at 03/12/23 0537   • iopamidol (ISOVUE-370) 76 % injection 100 mL  100 mL Intravenous Once in imaging Linda, Ziggy R, DO       • lisinopril (PRINIVIL,ZESTRIL) tablet 20 mg  20 mg Oral Q24H Linda, Ziggy R, DO   20 mg at 03/12/23 0801   • LORazepam (ATIVAN) tablet 0.5 mg  0.5 mg Oral Q8H PRN Linda, Ziggy R, DO       • melatonin tablet 5 mg  5 mg Oral Nightly PRN Linda, Ziggy R, DO       • metoprolol tartrate (LOPRESSOR) tablet 50 mg  50 mg Oral Q12H Linda, Ziggy R, DO   50 mg at 03/12/23 0801   • morphine injection 1 mg  1 mg Intravenous Q4H PRN Linda, Ziggy R, DO        And   • naloxone (NARCAN) injection 0.4 mg  0.4 mg Intravenous Q5 Min PRN Linda, Ziggy R, DO       • nitroglycerin (NITROSTAT) SL tablet 0.4 mg  0.4 mg Sublingual Q5 Min PRN Ziggy Mckeon R, DO       • ondansetron (ZOFRAN) tablet 4 mg  4 mg Oral Q6H PRN Ziggy Mckeon R, DO        Or   • ondansetron (ZOFRAN) injection 4 mg  4 mg Intravenous Q6H PRN Ziggy Mckeon R, DO       • sodium chloride 0.9 % flush 10 mL  10 mL Intravenous PRN  Linda, Ziggy R, DO       • sodium chloride 0.9 % flush 10 mL  10 mL Intravenous Q12H Linda, Ziggy R, DO   10 mL at 03/11/23 2007   • sodium chloride 0.9 % flush 10 mL  10 mL Intravenous PRN Linda, Ziggy R, DO       • sodium chloride 0.9 % infusion 40 mL  40 mL Intravenous PRN Linda, Ziggy R, DO       • sodium chloride 0.9 % infusion  75 mL/hr Intravenous Continuous Linda, Ziggy R, DO 75 mL/hr at 03/12/23 0601 75 mL/hr at 03/12/23 0601       Lab Review:     Results from last 7 days   Lab Units 03/12/23  0552   SODIUM mmol/L 137   POTASSIUM mmol/L 4.5   CHLORIDE mmol/L 103   CO2 mmol/L 23.0   BUN mg/dL 20   CREATININE mg/dL 0.53*   CALCIUM mg/dL 9.0   BILIRUBIN mg/dL 0.3   ALK PHOS U/L 72   ALT (SGPT) U/L 24   AST (SGOT) U/L 24   GLUCOSE mg/dL 154*     Results from last 7 days   Lab Units 03/11/23  1518 03/11/23  1305   HSTROP T ng/L 24* 25*         Results from last 7 days   Lab Units 03/12/23  0552   WBC 10*3/mm3 7.43   HEMOGLOBIN g/dL 12.5   HEMATOCRIT % 38.4   PLATELETS 10*3/mm3 240             Results from last 7 days   Lab Units 03/12/23  0552   CHOLESTEROL mg/dL 167   TRIGLYCERIDES mg/dL 66   HDL CHOL mg/dL 64*   LDL CHOL mg/dL 90     Results from last 7 days   Lab Units 03/11/23  1305   TSH uIU/mL 0.021*           EKG:   ECG/EMG Results (last 24 hours)     Procedure Component Value Units Date/Time    ECG 12 Lead Chest Pain [[id number]] Collected: 03/11/23 1208     Updated: 03/11/23 1225     QT Interval 356 ms      QTC Interval 386 ms     Narrative:      Test Reason : Chest Pain  Blood Pressure :   */*   mmHG  Vent. Rate :  71 BPM     Atrial Rate :  71 BPM     P-R Int : 170 ms          QRS Dur :  80 ms      QT Int : 356 ms       P-R-T Axes :  77  59  22 degrees     QTc Int : 386 ms    Normal sinus rhythm  Normal ECG  No previous ECGs available    Referred By:            Confirmed By:     ECG 12 Lead [[id number]] Resulted: 03/11/23 1401     Updated: 03/11/23 1401    ECG 12 Lead Chest Pain  [[id number]] Collected: 03/11/23 1521     Updated: 03/11/23 1659     QT Interval 368 ms      QTC Interval 410 ms     Narrative:      Test Reason : Chest Pain  Blood Pressure :   */*   mmHG  Vent. Rate :  75 BPM     Atrial Rate :  75 BPM     P-R Int : 186 ms          QRS Dur :  86 ms      QT Int : 368 ms       P-R-T Axes :  76  71  11 degrees     QTc Int : 410 ms    Normal sinus rhythm with sinus arrhythmia  Nonspecific ST and T wave abnormality  Abnormal ECG  When compared with ECG of 11-MAR-2023 12:08,  No significant change was found    Referred By:            Confirmed By:           ECHO:       Imaging:  Imaging Results (Last 24 Hours)     Procedure Component Value Units Date/Time    CT Angiogram Coronary [[id number]] Resulted: 03/12/23 0906     Updated: 03/12/23 0923    XR Chest 1 View [[id number]] Collected: 03/11/23 1239     Updated: 03/11/23 1328    Narrative:      EXAM: XR CHEST 1 VIEW    HISTORY: Chest Pain triage protocol  Chest Pain Triage Protocol    COMPARISON:    TECHNIQUE:   Portable view of the chest.    FINDINGS:   The lungs are well aerated. There is no pleural effusion. The  heart size is prominent. The mediastinal contours are within  normal limits. .  No evidence of focal consolidation. Unremarkable bronchovascular  markings. Unremarkable bilateral hemidiaphragms.  Unremarkable visualized osseous structures.      Impression:      Cardiomegaly.  No gross pneumonia or CHF.          Electronically signed by:  Medardo Cramer MD  3/11/2023 1:26 PM CST  Workstation: [id number]          I personally viewed and interpreted the patient's EKG/Telemetry data.    Assessment:   1.  Chest pain palpitation.  2.  Arterial hypertension.  3.  Hypertensive heart disease.  4.  Obesity.          Plan:   1.  Chest pain.  Patient has had symptoms of chest pain associated with palpitation.  Patient initial resting electrocardiogram did not show any acute ST-T wave changes.  Patient presented to the emergency room patient had  negative troponin.  Patient resting electrocardiogram did not show any acute ST-T wave changes.  Patient was unable to undergo a CTA of the coronaries secondary to the patient elevated heart rate.  Patient has had some dull aching discomfort with radiating to the left arm.  Patient at the present time has been recommended to undergo a Lexiscan Cardiolite stress test.    Risk-benefit treatment option for the myocardial perfusion Lexiscan Cardiolite stress test were discussed with the patient and an informed consent was obtained.    Myocardial perfusion scintigraphy (MPS)  was recommended to the patient as the best non-invasive modality for the assessment of obstructive CAD.  I  did spend some time discussing the procedure, as well as risks and benefits.  I also informed the patient that the risk of nonfatal MI or major cardiac complication is about  0.02%. The patient was also informed about the risks and benefits of MPS. Patient was also provided with a handout describing the test, as well as patient instructions prior to testing.      I also discussed the results of MPS as divided into risks.The NPV of this test is as high as 98%.  I also specifically discussed the risk of cardiac death with the following percentages:    Low-risk MPS:                          0.5% per year  Mildly abnormal MPS:              2.7%  Moderately abnormal:             2.9%  Severely abnormal:                 4.2%    2.  Arterial hypertension.  Patient blood pressure is elevated.  Patient has been started on metoprolol.  Patient has been counseled to decrease her salt intake.  Patient blood pressure would be followed on metoprolol lisinopril and amlodipine.    3.  Hypertensive heart disease.  Clinically at the present time patient is not in congestive heart failure.    4.  Obesity with a body mass index of 32.  Patient has been counseled on low-fat low-cholesterol diet weight reduction and lifestyle modification.  Patient has been  explained the risk associated with obesity and the current and progression of atherosclerotic coronary artery disease and peripheral vascular disease.    5.  Symptomatic palpitation.  Patient telemetry monitor has not reveal of any evidence of any arrhythmia.  Patient will be started on magnesium oxide 400 mg twice a day.    Thank you for the consultation.    The above plan of management were discussed with the patient      Time: Time spent in face-to-face evaluation of greater than 55 minutes interacting, formulating, examining and discussing the plan with the patient with 50% of greater time spent in face-to-face interaction.    Electronically signed by Mac Aquino MD, 03/12/23, 12:20 PM CDT.    Dictated utilizing Dragon dictation.

## 2023-03-12 NOTE — PROGRESS NOTES
[facility] Medicine Services  INPATIENT PROGRESS NOTE      Length of Stay: 0  Date of Admission: 3/11/2023  Primary Care Physician: Provider, No Known    Subjective   Chief Complaint:   Chest pain resolved  HPI:  The patient she is a 77-year-old female admitted to the hospital via the ER.  The patient comes in on day of admission complaining of substernal chest pain and pressure.  She states the issue started last night around midnight became progressively worse throughout the night.  She took Tums which did not help.  She continued to have pain when she awoke in the morning took 2 aspirin and came to the ER for evaluation.  She states over the last few days that she has been having some heart palpitations on exertion.  Become short of breath as well this could be related to deconditioning however patient is unsure.  She has had no diaphoresis.  No radiation of the pain to the jaw or to the upper extremity.  She has no fevers chills no headache.  No nausea vomiting no GI or urinary complaints.    Patient is seen and examined 3/12/2023.  On evaluation patient is currently comfortable sitting up in bed.  She is awake and alert.  CT of the coronaries attempted.  Heart rate could not be controlled with beta-blockers.  The patient is no longer having any chest pain.  She remained stable.  I discussed the case with Dr. Aquino and he is recommending patient be kept overnight I have Lexiscan completed in the a.m.  I reviewed this with the patient she is in agreement to proceed with procedure.  Dr. Aquino indicated he will see the patient in consultation today.    Review of Systems   Constitutional: Negative.  Negative for chills, fatigue and fever.   HENT: Negative.  Negative for congestion.    Eyes: Negative.    Respiratory: Positive for shortness of breath.         Improved.   Cardiovascular: Positive for chest pain. Negative for palpitations.        Resolved    Gastrointestinal: Negative.  Negative for abdominal distention, abdominal pain, nausea and vomiting.   Endocrine: Negative.    Genitourinary: Negative.    Musculoskeletal: Negative.    Skin: Negative.    Neurological: Negative.  Negative for dizziness and weakness.   Hematological: Negative.    Psychiatric/Behavioral: Negative.         All pertinent negatives and positives are as above. All other systems have been reviewed and are negative unless otherwise stated.     Objective    As of today 03/12/23  Temp:  [97 °F (36.1 °C)-99.3 °F (37.4 °C)] 97.8 °F (36.6 °C)  Heart Rate:  [52-93] 52  Resp:  [18-20] 18  BP: (127-209)/() 164/77    Physical Exam  Vitals and nursing note reviewed.   Constitutional:       Appearance: Normal appearance.   HENT:      Head: Normocephalic and atraumatic.      Right Ear: External ear normal.      Left Ear: External ear normal.      Nose: Nose normal.      Mouth/Throat:      Mouth: Mucous membranes are moist.      Pharynx: Oropharynx is clear.   Eyes:      General: No scleral icterus.     Extraocular Movements: Extraocular movements intact.      Conjunctiva/sclera: Conjunctivae normal.      Pupils: Pupils are equal, round, and reactive to light.   Cardiovascular:      Rate and Rhythm: Normal rate and regular rhythm.      Heart sounds: No murmur heard.  Pulmonary:      Effort: Pulmonary effort is normal.      Breath sounds: Normal breath sounds.   Abdominal:      General: Bowel sounds are normal.      Palpations: Abdomen is soft.   Musculoskeletal:         General: Normal range of motion.      Cervical back: Normal range of motion and neck supple.   Skin:     General: Skin is warm and dry.      Capillary Refill: Capillary refill takes less than 2 seconds.   Neurological:      General: No focal deficit present.      Mental Status: She is alert and oriented to person, place, and time.   Psychiatric:         Mood and Affect: Mood normal.         Behavior: Behavior normal.            aspirin, 81 mg, Oral, Daily  heparin (porcine), 5,000 Units, Subcutaneous, Q8H  iopamidol, 100 mL, Intravenous, Once in imaging  lisinopril, 20 mg, Oral, Q24H  metoprolol tartrate, 50 mg, Oral, Q12H  sodium chloride, 10 mL, Intravenous, Q12H         Results Review:  I have reviewed the labs, radiology results, and diagnostic studies.    Laboratory Data:   Results from last 7 days   Lab Units 03/12/23  0552 03/11/23  1305   SODIUM mmol/L 137 138   POTASSIUM mmol/L 4.5 4.2   CHLORIDE mmol/L 103 102   CO2 mmol/L 23.0 27.0   BUN mg/dL 20 18   CREATININE mg/dL 0.53* 0.58   GLUCOSE mg/dL 154* 92   CALCIUM mg/dL 9.0 10.0   BILIRUBIN mg/dL 0.3 0.3   ALK PHOS U/L 72 76   ALT (SGPT) U/L 24 14   AST (SGOT) U/L 24 17   ANION GAP mmol/L 11.0 9.0     Estimated Creatinine Clearance: 100.8 mL/min (A) (by C-G formula based on SCr of 0.53 mg/dL (L)).          Results from last 7 days   Lab Units 03/12/23  0552 03/11/23  1241   WBC 10*3/mm3 7.43 8.21   HEMOGLOBIN g/dL 12.5 13.3   HEMATOCRIT % 38.4 41.0   PLATELETS 10*3/mm3 240 262           Culture Data:   No results found for: BLOODCX  No results found for: URINECX  No results found for: RESPCX  No results found for: WOUNDCX  No results found for: STOOLCX  No components found for: BODYFLD    Radiology Data:   Imaging Results (Last 24 Hours)     Procedure Component Value Units Date/Time    CT Angiogram Coronary [[id number]] Resulted: 03/12/23 0906     Updated: 03/12/23 0923    XR Chest 1 View [[id number]] Collected: 03/11/23 1239     Updated: 03/11/23 1328    Narrative:      EXAM: XR CHEST 1 VIEW    HISTORY: Chest Pain triage protocol  Chest Pain Triage Protocol    COMPARISON:    TECHNIQUE:   Portable view of the chest.    FINDINGS:   The lungs are well aerated. There is no pleural effusion. The  heart size is prominent. The mediastinal contours are within  normal limits. .  No evidence of focal consolidation. Unremarkable bronchovascular  markings. Unremarkable bilateral  hemidiaphragms.  Unremarkable visualized osseous structures.      Impression:      Cardiomegaly.  No gross pneumonia or CHF.          Electronically signed by:  Medardo Cramer MD  3/11/2023 1:26 PM CST  Workstation: [id number]          I have reviewed the patient's current medications.     Assessment/Plan     Principal Problem:    Chest pain, unspecified type    Impression/plan  Chest pain  Follow-up CT of the coronary arteries and not able to complete.  Unable to perform study as heart rate would not remain controlled.  Troponin is 25 we will follow repeat  Paced the patient on beta-blocker, aspirin and as needed nitroglycerin  Diet cardiac  Cardiology consult: Dr. Aquino today.  Case discussed with cardiology and recommending Lexiscan for the a.m.    Lipid panel  Cholesterol total 167, HDL 64, LDL 90, VLDL 13 and triglycerides 66.     Uncontrolled hypertension  Patient takes lisinopril at home kidney function is good we will continue BUN is 28 creatinine is 0.5 and GFR is 94.2  We will add beta-blocker Lopressor 50 mg twice a day, lisinopril and Norvasc  Hydralazine as needed for uncontrolled pressure.     Morbid obesity  Continue nutritional support  BMI is 32.2    Hypothyroidism  TSH 0.021.  Will need follow-up with endocrinology on discharge     Deconditioning  PT OT eval and treat     DVT prophylaxis heparin     CODE STATUS full code     Medical Decision Making  Number and Complexity of problems: 3 complex problems  Differential Diagnosis: Acute coronary syndrome versus non-ST segment elevation MI     [facility] Data  External documents reviewed: Hospital chart  My EKG interpretation: EKG completed 3/11/2023  Normal sinus rhythm  Normal ECG  My plain film interpretation:  Chest x-ray completed 3/11/2023 showing cardiomegaly otherwise no acute cardiopulmonary process  Tests considered but not ordered: None     Decision rules/scores evaluated (example [id number], Wells, etc):      Discussed with: The patient and her   and ER physician and agree with current treatment plan     Treatment Plan  As above     Care Planning  Shared decision making: Patient updated on current status and informed of proposed care plan; is in agreement with plan  Code status and discussions: Full code     Disposition  Social Determinants of Health that impact treatment or disposition: N/A  I expect the patient to be discharged to home in 1 to 2 days     I confirmed that the patient's Advance Care Plan is present, code status is documented, or surrogate decision maker is listed in the patient's medical record.         The patient's surrogate decision maker is      I discussed my findings and recommendations with the patient and her  who is at bedside.     Estimated length of stay is 24 to 48 hours.           Ziggy Mckeon, DO

## 2023-03-12 NOTE — NURSING NOTE
Verified with Dr. Lange that CT notified him they were unable to do the test. Her heart rate got low enough but would not stay down consistently for them to test. MD agreed pt could go on and eat while he spoke with cardiology for further recommendations.

## 2023-03-12 NOTE — PLAN OF CARE
Goal Outcome Evaluation:  Plan of Care Reviewed With: patient            Pt started on norvasc today due to htn. No report of chest pain, soa, or dizziness. Patient has walked in the halls today. Pending cardiology consult.

## 2023-03-12 NOTE — SIGNIFICANT NOTE
03/12/23 1252   OTHER   Discipline occupational therapist;physical therapist   Rehab Time/Intention   Session Not Performed other (see comments)  (Pt awaiting CTA and cardio consult, will f/u as able.)   Recommendation   PT - Next Appointment 03/13/23   Recommendation   OT - Next Appointment 03/13/23

## 2023-03-13 ENCOUNTER — APPOINTMENT (OUTPATIENT)
Dept: NUCLEAR MEDICINE | Facility: HOSPITAL | Age: 78
End: 2023-03-13
Payer: MEDICARE

## 2023-03-13 ENCOUNTER — APPOINTMENT (OUTPATIENT)
Dept: CARDIOLOGY | Facility: HOSPITAL | Age: 78
End: 2023-03-13
Payer: MEDICARE

## 2023-03-13 VITALS
BODY MASS INDEX: 32.65 KG/M2 | DIASTOLIC BLOOD PRESSURE: 67 MMHG | SYSTOLIC BLOOD PRESSURE: 151 MMHG | OXYGEN SATURATION: 96 % | WEIGHT: 208 LBS | HEART RATE: 57 BPM | HEIGHT: 67 IN | TEMPERATURE: 98.1 F | RESPIRATION RATE: 18 BRPM

## 2023-03-13 LAB
ALBUMIN SERPL-MCNC: 3.8 G/DL (ref 3.5–5.2)
ALBUMIN/GLOB SERPL: 1.5 G/DL
ALP SERPL-CCNC: 62 U/L (ref 39–117)
ALT SERPL W P-5'-P-CCNC: 24 U/L (ref 1–33)
ANION GAP SERPL CALCULATED.3IONS-SCNC: 12 MMOL/L (ref 5–15)
AST SERPL-CCNC: 27 U/L (ref 1–32)
BASOPHILS # BLD AUTO: 0.03 10*3/MM3 (ref 0–0.2)
BASOPHILS NFR BLD AUTO: 0.3 % (ref 0–1.5)
BILIRUB SERPL-MCNC: 0.3 MG/DL (ref 0–1.2)
BUN SERPL-MCNC: 26 MG/DL (ref 8–23)
BUN/CREAT SERPL: 46.4 (ref 7–25)
CALCIUM SPEC-SCNC: 9 MG/DL (ref 8.6–10.5)
CHLORIDE SERPL-SCNC: 106 MMOL/L (ref 98–107)
CO2 SERPL-SCNC: 23 MMOL/L (ref 22–29)
CREAT SERPL-MCNC: 0.56 MG/DL (ref 0.57–1)
DEPRECATED RDW RBC AUTO: 41.3 FL (ref 37–54)
EGFRCR SERPLBLD CKD-EPI 2021: 93 ML/MIN/1.73
EOSINOPHIL # BLD AUTO: 0.01 10*3/MM3 (ref 0–0.4)
EOSINOPHIL NFR BLD AUTO: 0.1 % (ref 0.3–6.2)
ERYTHROCYTE [DISTWIDTH] IN BLOOD BY AUTOMATED COUNT: 12.5 % (ref 12.3–15.4)
GLOBULIN UR ELPH-MCNC: 2.5 GM/DL
GLUCOSE SERPL-MCNC: 88 MG/DL (ref 65–99)
HCT VFR BLD AUTO: 36 % (ref 34–46.6)
HGB BLD-MCNC: 11.6 G/DL (ref 12–15.9)
IMM GRANULOCYTES # BLD AUTO: 0.06 10*3/MM3 (ref 0–0.05)
IMM GRANULOCYTES NFR BLD AUTO: 0.6 % (ref 0–0.5)
LYMPHOCYTES # BLD AUTO: 2.82 10*3/MM3 (ref 0.7–3.1)
LYMPHOCYTES NFR BLD AUTO: 26.5 % (ref 19.6–45.3)
MCH RBC QN AUTO: 29.3 PG (ref 26.6–33)
MCHC RBC AUTO-ENTMCNC: 32.2 G/DL (ref 31.5–35.7)
MCV RBC AUTO: 90.9 FL (ref 79–97)
MONOCYTES # BLD AUTO: 0.9 10*3/MM3 (ref 0.1–0.9)
MONOCYTES NFR BLD AUTO: 8.5 % (ref 5–12)
NEUTROPHILS NFR BLD AUTO: 6.81 10*3/MM3 (ref 1.7–7)
NEUTROPHILS NFR BLD AUTO: 64 % (ref 42.7–76)
NRBC BLD AUTO-RTO: 0 /100 WBC (ref 0–0.2)
PLATELET # BLD AUTO: 230 10*3/MM3 (ref 140–450)
PMV BLD AUTO: 10.8 FL (ref 6–12)
POTASSIUM SERPL-SCNC: 3.5 MMOL/L (ref 3.5–5.2)
PROT SERPL-MCNC: 6.3 G/DL (ref 6–8.5)
RBC # BLD AUTO: 3.96 10*6/MM3 (ref 3.77–5.28)
SODIUM SERPL-SCNC: 141 MMOL/L (ref 136–145)
WBC NRBC COR # BLD: 10.63 10*3/MM3 (ref 3.4–10.8)

## 2023-03-13 PROCEDURE — 97161 PT EVAL LOW COMPLEX 20 MIN: CPT

## 2023-03-13 PROCEDURE — 96372 THER/PROPH/DIAG INJ SC/IM: CPT

## 2023-03-13 PROCEDURE — 85025 COMPLETE CBC W/AUTO DIFF WBC: CPT | Performed by: HOSPITALIST

## 2023-03-13 PROCEDURE — 78452 HT MUSCLE IMAGE SPECT MULT: CPT | Performed by: INTERNAL MEDICINE

## 2023-03-13 PROCEDURE — 93010 ELECTROCARDIOGRAM REPORT: CPT | Performed by: INTERNAL MEDICINE

## 2023-03-13 PROCEDURE — G0378 HOSPITAL OBSERVATION PER HR: HCPCS

## 2023-03-13 PROCEDURE — 25010000002 REGADENOSON 0.4 MG/5ML SOLUTION: Performed by: INTERNAL MEDICINE

## 2023-03-13 PROCEDURE — 78452 HT MUSCLE IMAGE SPECT MULT: CPT

## 2023-03-13 PROCEDURE — 93017 CV STRESS TEST TRACING ONLY: CPT

## 2023-03-13 PROCEDURE — 97165 OT EVAL LOW COMPLEX 30 MIN: CPT

## 2023-03-13 PROCEDURE — 0 TECHNETIUM SESTAMIBI: Performed by: HOSPITALIST

## 2023-03-13 PROCEDURE — 25010000002 HEPARIN (PORCINE) PER 1000 UNITS: Performed by: HOSPITALIST

## 2023-03-13 PROCEDURE — 80053 COMPREHEN METABOLIC PANEL: CPT | Performed by: HOSPITALIST

## 2023-03-13 PROCEDURE — A9500 TC99M SESTAMIBI: HCPCS | Performed by: HOSPITALIST

## 2023-03-13 RX ORDER — AMLODIPINE BESYLATE 10 MG/1
10 TABLET ORAL
Qty: 30 TABLET | Refills: 0 | Status: SHIPPED | OUTPATIENT
Start: 2023-03-14 | End: 2023-03-21

## 2023-03-13 RX ORDER — ASPIRIN 81 MG/1
81 TABLET ORAL DAILY
Qty: 30 TABLET | Refills: 0 | Status: SHIPPED | OUTPATIENT
Start: 2023-03-14 | End: 2023-04-13

## 2023-03-13 RX ORDER — SODIUM CHLORIDE 0.9 % (FLUSH) 0.9 %
10 SYRINGE (ML) INJECTION ONCE
Status: COMPLETED | OUTPATIENT
Start: 2023-03-13 | End: 2023-03-13

## 2023-03-13 RX ORDER — METOPROLOL TARTRATE 50 MG/1
50 TABLET, FILM COATED ORAL EVERY 12 HOURS SCHEDULED
Qty: 60 TABLET | Refills: 0 | Status: SHIPPED | OUTPATIENT
Start: 2023-03-13 | End: 2023-03-21 | Stop reason: SDUPTHER

## 2023-03-13 RX ADMIN — METOPROLOL TARTRATE 50 MG: 50 TABLET, FILM COATED ORAL at 10:42

## 2023-03-13 RX ADMIN — Medication 10 ML: at 09:07

## 2023-03-13 RX ADMIN — TECHNETIUM TC 99M SESTAMIBI 1 DOSE: 1 INJECTION INTRAVENOUS at 09:08

## 2023-03-13 RX ADMIN — AMLODIPINE BESYLATE 10 MG: 10 TABLET ORAL at 10:42

## 2023-03-13 RX ADMIN — TECHNETIUM TC 99M SESTAMIBI 1 DOSE: 1 INJECTION INTRAVENOUS at 07:58

## 2023-03-13 RX ADMIN — REGADENOSON 0.4 MG: 0.08 INJECTION, SOLUTION INTRAVENOUS at 09:07

## 2023-03-13 RX ADMIN — Medication 400 MG: at 10:42

## 2023-03-13 RX ADMIN — Medication 81 MG: at 10:42

## 2023-03-13 RX ADMIN — HEPARIN SODIUM 5000 UNITS: 5000 INJECTION INTRAVENOUS; SUBCUTANEOUS at 06:02

## 2023-03-13 RX ADMIN — LISINOPRIL 20 MG: 20 TABLET ORAL at 10:42

## 2023-03-13 RX ADMIN — Medication 10 ML: at 10:43

## 2023-03-13 NOTE — THERAPY DISCHARGE NOTE
Patient Name: Larisa Vazquez  : 1945    MRN: [id number]                              Today's Date: 3/13/2023       Admit Date: 3/11/2023    Visit Dx:     ICD-10-CM ICD-9-CM   1. Chest pain, unspecified type  R07.9 786.50   2. Impaired mobility and ADLs  Z74.09 V49.89    Z78.9    3. Impaired functional mobility, balance, gait, and endurance  Z74.09 V49.89     Patient Active Problem List   Diagnosis   • Chest pain, unspecified type     Past Medical History:   Diagnosis Date   • Hypertension      Past Surgical History:   Procedure Laterality Date   • HYSTERECTOMY        General Information     Row Name 23 1057          Physical Therapy Time and Intention    Document Type evaluation  -CZ     Mode of Treatment physical therapy;occupational therapy  -CZ     Row Name 23 1057          General Information    Patient Profile Reviewed yes  -CZ     Prior Level of Function independent:;all household mobility  -CZ     Row Name 23 1057          Living Environment    People in Home spouse  -CZ     Row Name 23 1057          Home Main Entrance    Number of Stairs, Main Entrance five  -CZ     Stair Railings, Main Entrance railing on right side (ascending)  -CZ     Row Name 23 1057          Stairs Within Home, Primary    Stairs, Within Home, Primary (I) ambulation without an AD, no DME. Walk in shower with seat, tall toilet.  -CZ     Number of Stairs, Within Home, Primary none  -CZ     Row Name 23 1057          Cognition    Orientation Status (Cognition) oriented x 4  -CZ           User Key  (r) = Recorded By, (t) = Taken By, (c) = Cosigned By    Initials Name Provider Type    CZ Gordon Arizmendi, PT Physical Therapist               Mobility     Row Name 23 1057          Bed Mobility    Bed Mobility supine-sit;sit-supine  -CZ     Supine-Sit [place] (Bed Mobility) modified independence  -CZ     Sit-Supine [place] (Bed Mobility) modified independence  -CZ     Assistive Device  (Bed Mobility) head of bed elevated  -CZ     Row Name 03/13/23 1057          Sit-Stand Transfer    Sit-Stand [place] (Transfers) independent  -CZ     Row Name 03/13/23 1057          Gait/Stairs (Locomotion)    [place] Level (Gait) independent  -     Distance in Feet (Gait) 150'x1.  -CZ     Comment, (Gait/Stairs) No AD, fast leia, no LOB.  -CZ           User Key  (r) = Recorded By, (t) = Taken By, (c) = Cosigned By    Initials Name Provider Type    CZ Gordon Arizmendi, PT Physical Therapist               Obj/Interventions     Row Name 03/13/23 1057          Range of Motion Comprehensive    General Range of Motion bilateral lower extremity ROM WFL  -     Row Name 03/13/23 1057          Strength Comprehensive (MMT)    Comment, General Manual Muscle Testing (MMT) Assessment BLEs: 4/5 grossly.  -CZ     Row Name 03/13/23 1057          Sensory Assessment (Somatosensory)    Sensory Assessment (Somatosensory) LE sensation intact  -           User Key  (r) = Recorded By, (t) = Taken By, (c) = Cosigned By    Initials Name Provider Type    CZ Gordon Arizmendi, PT Physical Therapist               Goals/Plan    No documentation.                Clinical Impression     Row Name 03/13/23 1057          Pain    Pretreatment Pain Rating 0/10 - no pain  -CZ     Posttreatment Pain Rating 0/10 - no pain  -CZ     Row Name 03/13/23 1057          Plan of Care Review    Plan of Care Reviewed With patient  -     Outcome Evaluation Initial PT evaluation complete, co-evaluation with OT. Patient is alert, cooperative.  She demonstrates (I) with bed mobility, transfers and gait, ambulating 150'x1 without an AD, no LOB, fast leia.  Tinetti assessed: 26/28 or low fall risk, FWW not necessary.  Patient is at PLOF, demonstrates (I) with functional mobility and is at low risk for falls.  No further PT indicated, skilled PT discharged, RN updated.  -     Row Name 03/13/23 1057          Therapy Assessment/Plan (PT)    Criteria  for Skilled Interventions Met (PT) no;no problems identified which require skilled intervention  -CZ     Therapy Frequency (PT) evaluation only  -CZ     Row Name 03/13/23 1057          Vital Signs    Pre Systolic BP Rehab 171  -CZ     Pre Treatment Diastolic BP 72  -CZ     Pretreatment Heart Rate (beats/min) 67  -CZ     Pre SpO2 (%) 96  -CZ     O2 Delivery Pre Treatment room air  -CZ     Pre Patient Position Supine  -CZ     Row Name 03/13/23 1057          Positioning and Restraints    Pre-Treatment Position in bed  -CZ     Post Treatment Position bed  -CZ     In Bed sitting EOB;call light within reach;with family/caregiver  -CZ           User Key  (r) = Recorded By, (t) = Taken By, (c) = Cosigned By    Initials Name Provider Type    CZ Gordon Arizmendi, PT Physical Therapist               Outcome Measures     Row Name 03/13/23 1057 03/13/23 1048       How much help from another person do you currently need...    Turning from your back to your side while in flat bed without using bedrails? 4  -CZ 4  -KD    Moving from lying on back to sitting on the side of a flat bed without bedrails? 4  -CZ 4  -KD    Moving to and from a bed to a chair (including a wheelchair)? 4  -CZ 4  -KD    Standing up from a chair using your arms (e.g., wheelchair, bedside chair)? 4  -CZ 4  -KD    Climbing 3-5 steps with a railing? 4  -CZ 4  -KD    To walk in hospital room? 4  -CZ 4  -KD    AM-PAC 6 Clicks Score (PT) 24  -CZ 24  -KD    Highest level of mobility 8 --> Walked 250 feet or more  -CZ 8 --> Walked 250 feet or more  -KD    Row Name 03/13/23 1057          Tinetti Assessment    Tinetti Assessment yes  -CZ     Sitting Balance 1  -CZ     Arises 2  -CZ     Attempts to Rise 2  -CZ     Immediate Standing Balance (first 5 sec) 2  -CZ     Standing Balance 1  -CZ     Sternal Nudge (feet close together) 2  -CZ     Eyes Closed (feet close together) 1  -CZ     Turning 360 Degrees- Steps 1  -CZ     Turning 360 Degrees- Steadiness 1  -CZ      "Sitting Down 2  -CZ     Tinetti Balance Score 15  -CZ     Gait Initiation (immediate after told \"go\") 1  -CZ     Step Length- Right Swing 1  -CZ     Step Length- Left Swing 1  -CZ     Foot Clearance- Right Foot 1  -CZ     Foot Clearance- Left Foot 1  -CZ     Step Symmetry 1  -CZ     Step Continuity 1  -CZ     Path (excursion) 1  -CZ     Trunk 2  -CZ     Base of Support 1  -CZ     Gait Score 11  -CZ     Tinetti Total Score 26  -CZ     Tinetti Assessment Comments No AD.  -     Row Name 03/13/23 1058 03/13/23 1057       Functional Assessment    Outcome Measure Options AM-PAC 6 Clicks Daily Activity (OT)  - AM-PAC 6 Clicks Basic Mobility (PT);Tinetti  -          User Key  (r) = Recorded By, (t) = Taken By, (c) = Cosigned By    Initials Name Provider Type     Gordon Arizmendi, PT Physical Therapist    Deanna Ansari, RN Registered Nurse     Rosa Parkinson, OT Occupational Therapist              Physical Therapy Education     Title: PT OT SLP Therapies (In Progress)     Topic: Physical Therapy (In Progress)     Point: Mobility training (Not Started)     Learner Progress:  Not documented in this visit.          Point: Home exercise program (Not Started)     Learner Progress:  Not documented in this visit.          Point: Body mechanics (Not Started)     Learner Progress:  Not documented in this visit.          Point: Precautions (Done)     Learning Progress Summary           Patient Acceptance, E, VU by  at 3/13/2023 1137    Comment: Tinetti assessed: 26/28 or low fall risk, FWW not necessary.                               User Key     Initials Effective Dates Name Provider Type Discipline     09/18/22 -  Gordon Arizmendi, PT Physical Therapist PT              PT Recommendation and Plan     Plan of Care Reviewed With: patient  Outcome Evaluation: Initial PT evaluation complete, co-evaluation with OT. Patient is alert, cooperative.  She demonstrates (I) with bed mobility, transfers and gait, ambulating " 150'x1 without an AD, no LOB, fast leia.  Tinetti assessed: 26/28 or low fall risk, FWW not necessary.  Patient is at PLOF, demonstrates (I) with functional mobility and is at low risk for falls.  No further PT indicated, skilled PT discharged, RN updated.     Time Calculation:    PT Charges     Row Name 03/13/23 1140             Time Calculation    Start Time 1057  -CZ      Stop Time 1120  -CZ      Time Calculation (min) 23 min  -CZ      PT Received On 03/13/23  -CZ      PT Goal Re-Cert Due Date 03/13/23  -CZ         Untimed Charges    PT Eval/Re-eval Minutes 23  -CZ         Total Minutes    Untimed Charges Total Minutes 23  -CZ       Total Minutes 23  -CZ            User Key  (r) = Recorded By, (t) = Taken By, (c) = Cosigned By    Initials Name Provider Type    CZ Gordon Arizmendi, PT Physical Therapist              Therapy Charges for Today     Code Description Service Date Service Provider Modifiers Qty    80225790386 HC PT EVAL LOW COMPLEXITY 2 3/13/2023 Gordon Arizmendi, PT GP 1          PT G-Codes  Outcome Measure Options: AM-PAC 6 Clicks Daily Activity (OT)  AM-PAC 6 Clicks Score (PT): 24  AM-PAC 6 Clicks Score (OT): 24  Tinetti Total Score: 26    PT Discharge Summary  Anticipated Discharge Disposition (PT): home    Gordon Arizmendi PT  3/13/2023

## 2023-03-13 NOTE — PLAN OF CARE
Goal Outcome Evaluation:  Plan of Care Reviewed With: patient, spouse        Progress: no change  Outcome Evaluation: OT june completed, co-eval with PT. Pt standing bedside upon entering and agreeable to the session. Pt is mod I for bed mobility with HOB elevated. Pt is independent for sit to stand t/f and for fxnl mobility in the hallway. Pt is independent for donning/doffing socks while sitting EOB. Pt BUE MMT 4/5 grossly. Pt reports that she is at baseline fxn, OT to sign off at this time. Recommend d/c home.

## 2023-03-13 NOTE — DISCHARGE SUMMARY
[facility] Medicine Services  DISCHARGE SUMMARY       Date of Admission: 3/11/2023  Date of Discharge:  3/13/2023  Primary Care Physician: Provider, No Known    Presenting Problem/History of Present Illness:  Chest pain, unspecified type [R07.9]     Final Discharge Diagnoses:  Active Hospital Problems    Diagnosis    • **Chest pain, unspecified type        Consults:   Consults     Date and Time Order Name Status Description    3/12/2023 10:57 AM Inpatient Cardiology Consult Completed           Procedures Performed:                 Pertinent Test Results:   Lab Results (most recent)     Procedure Component Value Units Date/Time    Comprehensive Metabolic Panel [[id number]]  (Abnormal) Collected: 03/13/23 0546    Specimen: Blood Updated: 03/13/23 0638     Glucose 88 mg/dL      BUN 26 mg/dL      Creatinine 0.56 mg/dL      Sodium 141 mmol/L      Potassium 3.5 mmol/L      Chloride 106 mmol/L      CO2 23.0 mmol/L      Calcium 9.0 mg/dL      Total Protein 6.3 g/dL      Albumin 3.8 g/dL      ALT (SGPT) 24 U/L      AST (SGOT) 27 U/L      Alkaline Phosphatase 62 U/L      Total Bilirubin 0.3 mg/dL      Globulin 2.5 gm/dL      A/G Ratio 1.5 g/dL      BUN/Creatinine Ratio 46.4     Anion Gap 12.0 mmol/L      eGFR 93.0 mL/min/1.73     Narrative:      GFR Normal >60  Chronic Kidney Disease <60  Kidney Failure <15    The GFR formula is only valid for adults with stable renal function between ages 18 and 70.    CBC Auto Differential [[id number]]  (Abnormal) Collected: 03/13/23 0546    Specimen: Blood Updated: 03/13/23 0626     WBC 10.63 10*3/mm3      RBC 3.96 10*6/mm3      Hemoglobin 11.6 g/dL      Hematocrit 36.0 %      MCV 90.9 fL      MCH 29.3 pg      MCHC 32.2 g/dL      RDW 12.5 %      RDW-SD 41.3 fl      MPV 10.8 fL      Platelets 230 10*3/mm3      Neutrophil % 64.0 %      Lymphocyte % 26.5 %      Monocyte % 8.5 %      Eosinophil % 0.1 %      Basophil % 0.3 %      Immature Grans  % 0.6 %      Neutrophils, Absolute 6.81 10*3/mm3      Lymphocytes, Absolute 2.82 10*3/mm3      Monocytes, Absolute 0.90 10*3/mm3      Eosinophils, Absolute 0.01 10*3/mm3      Basophils, Absolute 0.03 10*3/mm3      Immature Grans, Absolute 0.06 10*3/mm3      nRBC 0.0 /100 WBC     Comprehensive Metabolic Panel [[id number]]  (Abnormal) Collected: 03/12/23 0552    Specimen: Blood Updated: 03/12/23 0632     Glucose 154 mg/dL      BUN 20 mg/dL      Creatinine 0.53 mg/dL      Sodium 137 mmol/L      Potassium 4.5 mmol/L      Chloride 103 mmol/L      CO2 23.0 mmol/L      Calcium 9.0 mg/dL      Total Protein 6.6 g/dL      Albumin 3.8 g/dL      ALT (SGPT) 24 U/L      AST (SGOT) 24 U/L      Alkaline Phosphatase 72 U/L      Total Bilirubin 0.3 mg/dL      Globulin 2.8 gm/dL      A/G Ratio 1.4 g/dL      BUN/Creatinine Ratio 37.7     Anion Gap 11.0 mmol/L      eGFR 94.2 mL/min/1.73     Narrative:      GFR Normal >60  Chronic Kidney Disease <60  Kidney Failure <15    The GFR formula is only valid for adults with stable renal function between ages 18 and 70.    Lipid Panel [[id number]]  (Abnormal) Collected: 03/12/23 0552    Specimen: Blood Updated: 03/12/23 0632     Total Cholesterol 167 mg/dL      Triglycerides 66 mg/dL      HDL Cholesterol 64 mg/dL      LDL Cholesterol  90 mg/dL      VLDL Cholesterol 13 mg/dL      LDL/HDL Ratio 1.40    Narrative:      Cholesterol Reference Ranges  (U.S. Department of Health and Human Services ATP III Classifications)    Desirable          <200 mg/dL  Borderline High    200-239 mg/dL  High Risk          >240 mg/dL      Triglyceride Reference Ranges  (U.S. Department of Health and Human Services ATP III Classifications)    Normal           <150 mg/dL  Borderline High  150-199 mg/dL  High             200-499 mg/dL  Very High        >500 mg/dL    HDL Reference Ranges  (U.S. Department of Health and Human Services ATP III Classifications)    Low     <40 mg/dl (major risk factor for CHD)  High    >60  mg/dl ('negative' risk factor for CHD)        LDL Reference Ranges  (U.S. Department of Health and Human Services ATP III Classifications)    Optimal          <100 mg/dL  Near Optimal     100-129 mg/dL  Borderline High  130-159 mg/dL  High             160-189 mg/dL  Very High        >189 mg/dL    CBC Auto Differential [[id number]]  (Abnormal) Collected: 03/12/23 0552    Specimen: Blood Updated: 03/12/23 0609     WBC 7.43 10*3/mm3      RBC 4.27 10*6/mm3      Hemoglobin 12.5 g/dL      Hematocrit 38.4 %      MCV 89.9 fL      MCH 29.3 pg      MCHC 32.6 g/dL      RDW 12.4 %      RDW-SD 40.7 fl      MPV 10.6 fL      Platelets 240 10*3/mm3      Neutrophil % 88.5 %      Lymphocyte % 10.1 %      Monocyte % 0.8 %      Eosinophil % 0.0 %      Basophil % 0.3 %      Immature Grans % 0.3 %      Neutrophils, Absolute 6.58 10*3/mm3      Lymphocytes, Absolute 0.75 10*3/mm3      Monocytes, Absolute 0.06 10*3/mm3      Eosinophils, Absolute 0.00 10*3/mm3      Basophils, Absolute 0.02 10*3/mm3      Immature Grans, Absolute 0.02 10*3/mm3      nRBC 0.0 /100 WBC     Extra Tubes [[id number]] Collected: 03/11/23 1305    Specimen: Blood, Venous Line Updated: 03/11/23 1715    Narrative:      The following orders were created for panel order Extra Tubes.  Procedure                               Abnormality         Status                     ---------                               -----------         ------                     Lavender Top[[id number]]                                     Final result               Pen[place]za Top[[id number]]                                         Final result                 Please view results for these tests on the individual orders.    Gray Top [[id number]] Collected: 03/11/23 1305    Specimen: Blood Updated: 03/11/23 1715     Extra Tube Hold for add-ons.     Comment: Auto resulted.       High Sensitivity Troponin T 2Hr [[id number]]  (Abnormal) Collected: 03/11/23 1518    Specimen: Blood Updated: 03/11/23 1540     HS  Troponin T 24 ng/L      Troponin T Delta -1 ng/L     Narrative:      High Sensitive Troponin T Reference Range:  <10.0 ng/L- Negative Female for AMI  <15.0 ng/L- Negative Male for AMI  >=10 - Abnormal Female indicating possible myocardial injury.  >=15 - Abnormal Male indicating possible myocardial injury.   Clinicians would have to utilize clinical acumen, EKG, Troponin, and serial changes to determine if it is an Acute Myocardial Infarction or myocardial injury due to an underlying chronic condition.         TSH [[id number]]  (Abnormal) Collected: 03/11/23 1305    Specimen: Blood Updated: 03/11/23 1533     TSH 0.021 uIU/mL     Lavender Top [[id number]] Collected: 03/11/23 1305    Specimen: Blood Updated: 03/11/23 1415     Extra Tube hold for add-on     Comment: Auto resulted       [place]sey Draw [[id number]] Collected: 03/11/23 1241    Specimen: Blood Updated: 03/11/23 1415    Narrative:      The following orders were created for panel order [place] Draw.  Procedure                               Abnormality         Status                     ---------                               -----------         ------                     Green Top (Gel)[[id number]]                                  Final result               Lavender Top[[id number]]                                     Final result               Gold Top - SST[[id number]]                                   Final result               Light Blue Top[[id number]]                                   Final result                 Please view results for these tests on the individual orders.    Green Top (Gel) [[id number]] Collected: 03/11/23 1305    Specimen: Blood Updated: 03/11/23 1415     Extra Tube Hold for add-ons.     Comment: Auto resulted.       Gold Top - SST [[id number]] Collected: 03/11/23 1305    Specimen: Blood Updated: 03/11/23 1415     Extra Tube Hold for add-ons.     Comment: Auto resulted.       Light Blue Top [[id number]] Collected: 03/11/23 1305    Specimen:  Blood Updated: 03/11/23 1415     Extra Tube Hold for add-ons.     Comment: Auto resulted       Lavender Top [[id number]] Collected: 03/11/23 1241    Specimen: Blood Updated: 03/11/23 1345     Extra Tube hold for add-on     Comment: Auto resulted       High Sensitivity Troponin T [[id number]]  (Abnormal) Collected: 03/11/23 1305    Specimen: Blood Updated: 03/11/23 1344     HS Troponin T 25 ng/L     Narrative:      High Sensitive Troponin T Reference Range:  <10.0 ng/L- Negative Female for AMI  <15.0 ng/L- Negative Male for AMI  >=10 - Abnormal Female indicating possible myocardial injury.  >=15 - Abnormal Male indicating possible myocardial injury.   Clinicians would have to utilize clinical acumen, EKG, Troponin, and serial changes to determine if it is an Acute Myocardial Infarction or myocardial injury due to an underlying chronic condition.         BNP [[id number]]  (Normal) Collected: 03/11/23 1305    Specimen: Blood Updated: 03/11/23 1342     proBNP 1,082.0 pg/mL     Narrative:      Among patients with dyspnea, NT-proBNP is highly sensitive for the detection of acute congestive heart failure. In addition NT-proBNP of <300 pg/ml effectively rules out acute congestive heart failure with 99% negative predictive value.      CBC & Differential [[id number]]  (Abnormal) Collected: 03/11/23 1241    Specimen: Blood Updated: 03/11/23 1249    Narrative:      The following orders were created for panel order CBC & Differential.  Procedure                               Abnormality         Status                     ---------                               -----------         ------                     CBC Auto Differential[[id number]]        Abnormal            Final result                 Please view results for these tests on the individual orders.        Imaging Results (Most Recent)     Procedure Component Value Units Date/Time    XR Chest 1 View [[id number]] Collected: 03/11/23 1239     Updated: 03/11/23 1328    Narrative:    "   EXAM: XR CHEST 1 VIEW    HISTORY: Chest Pain triage protocol  Chest Pain Triage Protocol    COMPARISON:    TECHNIQUE:   Portable view of the chest.    FINDINGS:   The lungs are well aerated. There is no pleural effusion. The  heart size is prominent. The mediastinal contours are within  normal limits. .  No evidence of focal consolidation. Unremarkable bronchovascular  markings. Unremarkable bilateral hemidiaphragms.  Unremarkable visualized osseous structures.      Impression:      Cardiomegaly.  No gross pneumonia or CHF.          Electronically signed by:  Medardo Cramer MD  3/11/2023 1:26 PM CST  Workstation: [id number]          Chief Complaint on Day of Discharge: No new complaints    Hospital Course:  The patient is a 77 y.o. female with past medical history notable for hypertension who presented to [facility] with chest pain.  Patient was admitted and cardiology was consulted.  CTA of the coronaries was recommended but could not be completed due to elevated heart rate.  Patient underwent stress testing and this was read as a low risk study.  Patient was cleared for discharge and will follow up with PCP.  Her medications were adjusted during her stay as well and she will continue with these changes..      Condition on Discharge: Stable    Physical Exam on Discharge:  /67 (BP Location: Left arm, Patient Position: Lying)   Pulse 57   Temp 98.1 °F (36.7 °C) (Temporal)   Resp 18   Ht 170.2 cm (67.01\")   Wt 94.3 kg (208 lb)   SpO2 96%   BMI 32.57 kg/m²   Physical Exam  Constitutional:       General: She is not in acute distress.     Appearance: She is not toxic-appearing.   HENT:      Head: Normocephalic and atraumatic.      Right Ear: External ear normal.      Left Ear: External ear normal.      Nose: Nose normal.      Mouth/Throat:      Mouth: Mucous membranes are moist.      Pharynx: Oropharynx is clear.   Eyes:      Conjunctiva/sclera: Conjunctivae normal.   Cardiovascular:      " Rate and Rhythm: Normal rate and regular rhythm.      Heart sounds: Normal heart sounds.   Pulmonary:      Effort: Pulmonary effort is normal. No respiratory distress.      Breath sounds: Normal breath sounds.   Abdominal:      General: Bowel sounds are normal.      Palpations: Abdomen is soft.      Tenderness: There is no abdominal tenderness.   Musculoskeletal:         General: No deformity.   Skin:     General: Skin is warm and dry.      Capillary Refill: Capillary refill takes less than 2 seconds.   Neurological:      General: No focal deficit present.      Mental Status: She is alert and oriented to person, place, and time. Mental status is at baseline.   Psychiatric:         Behavior: Behavior normal.       Discharge Disposition:  Home or Self Care    Discharge Medications:     Discharge Medications      New Medications      Instructions Start Date   amLODIPine 10 MG tablet  Commonly known as: NORVASC   10 mg, Oral, Every 24 Hours Scheduled   Start Date: March 14, 2023     aspirin 81 MG EC tablet   81 mg, Oral, Daily   Start Date: March 14, 2023     metoprolol tartrate 50 MG tablet  Commonly known as: LOPRESSOR   50 mg, Oral, Every 12 Hours Scheduled         Continue These Medications      Instructions Start Date   lisinopril 20 MG tablet  Commonly known as: PRINIVIL,ZESTRIL   20 mg, Oral, Daily             Discharge Diet:   Diet Instructions     Diet: Regular      Discharge Diet: Regular          Activity at Discharge:   Activity Instructions     Gradually Increase Activity Until at Pre-Hospitalization Level            Discharge Care Plan/Instructions: Take medications as prescribed, follow-up with PCP, and return for worsening symptoms.    Follow-up Appointments:   No future appointments.    Test Results Pending at Discharge:       Velasquez Sunshine MD    Time: 30 minutes

## 2023-03-13 NOTE — THERAPY DISCHARGE NOTE
Acute Care - Occupational Therapy Discharge  [facility]    Patient Name: Larisa Vazquez  : 1945    MRN: [id number]                              Today's Date: 3/13/2023       Admit Date: 3/11/2023    Visit Dx:     ICD-10-CM ICD-9-CM   1. Chest pain, unspecified type  R07.9 786.50   2. Impaired mobility and ADLs  Z74.09 V49.89    Z78.9      Patient Active Problem List   Diagnosis   • Chest pain, unspecified type     Past Medical History:   Diagnosis Date   • Hypertension      Past Surgical History:   Procedure Laterality Date   • HYSTERECTOMY        General Information     Row Name 23 1058          OT Time and Intention    Document Type evaluation  -     Mode of Treatment co-treatment;physical therapy;occupational therapy  -     Row Name 23 1058          General Information    Patient Profile Reviewed yes  -     Prior Level of Function independent:;grooming;dressing;bathing;home management;all household mobility;ADL's;driving;cooking;cleaning  -     Row Name 23 1058          Living Environment    People in Home spouse  -     Row Name 23 1058          Home Main Entrance    Number of Stairs, Main Entrance five  -     Stair Railings, Main Entrance railing on right side (ascending)  -     Row Name 23 1058          Stairs Within Home, Primary    Stairs, Within Home, Primary walk in shower with chair, tall toilet  -     Number of Stairs, Within Home, Primary none  -     Row Name 23 1058          Cognition    Orientation Status (Cognition) oriented x 4  -           User Key  (r) = Recorded By, (t) = Taken By, (c) = Cosigned By    Initials Name Provider Type     Rosa Parkinson OT Occupational Therapist               Mobility/ADL's     Row Name 23 1058          Bed Mobility    Bed Mobility supine-sit  -     Supine-Sit [place] (Bed Mobility) modified independence  -     Assistive Device (Bed Mobility) head of bed elevated  -     Row Name  03/13/23 1058          Transfers    Transfers sit-stand transfer;stand-sit transfer  -Formerly [facility] 03/13/23 1058          Sit-Stand Transfer    Sit-Stand [place] (Transfers) independent  -MC     Row Name 03/13/23 1058          Stand-Sit Transfer    Stand-Sit [place] (Transfers) independent  -MC     Row Name 03/13/23 1058          Activities of Daily Living    BADL Assessment/Intervention lower body dressing  -Formerly [facility] 03/13/23 1058          Lower Body Dressing Assessment/Training    [place] Level (Lower Body Dressing) don;doff;socks;independent  -     Position (Lower Body Dressing) edge of bed sitting  -           User Key  (r) = Recorded By, (t) = Taken By, (c) = Cosigned By    Initials Name Provider Type    Rosa Rios OT Occupational Therapist               Obj/Interventions     Row Name 03/13/23 1058          Sensory Assessment (Somatosensory)    Sensory Assessment (Somatosensory) UE sensation intact  -Formerly [facility] 03/13/23 1058          Range of Motion Comprehensive    General Range of Motion bilateral upper extremity ROM WFL  -Formerly [facility] 03/13/23 1058          Strength Comprehensive (MMT)    Comment, General Manual Muscle Testing (MMT) Assessment BUE MMT 4/5 grossly  -           User Key  (r) = Recorded By, (t) = Taken By, (c) = Cosigned By    Initials Name Provider Type    Rosa Rios OT Occupational Therapist               Goals/Plan    No documentation.                Clinical Impression     Row Name 03/13/23 1058          Pain Assessment    Pretreatment Pain Rating 0/10 - no pain  -     Posttreatment Pain Rating 0/10 - no pain  -MC     Row Name 03/13/23 1058          Plan of Care Review    Plan of Care Reviewed With patient;spouse  -     Progress no change  -     Outcome Evaluation OT eval completed, co-eval with PT. Pt standing bedside upon entering and agreeable to the session. Pt is mod I for bed mobility with HOB elevated. Pt is  independent for sit to stand t/f and for fxnl mobility in the hallway. Pt is independent for donning/doffing socks while sitting EOB. Pt BUE MMT 4/5 grossly. Pt reports that she is at baseline fxn, OT to sign off at this time. Recommend d/c home.  -     Row Name 03/13/23 1058          Therapy Assessment/Plan (OT)    Patient/Family Therapy Goal Statement (OT) return home  -     Rehab Potential (OT) --  -     Criteria for Skilled Therapeutic Interventions Met (OT) no problems identified which require skilled intervention  -     Therapy Frequency (OT) evaluation only  -     Predicted Duration of Therapy Intervention (OT) until all goals met or d/c  -     Row Name 03/13/23 1058          Vital Signs    Pre Systolic BP Rehab 171  -MC     Pre Treatment Diastolic BP 72  -MC     Pretreatment Heart Rate (beats/min) 67  -MC     Pre SpO2 (%) 96  -MC     Pre Patient Position Supine  -     Row Name 03/13/23 1058          Positioning and Restraints    Pre-Treatment Position standing in room  -     Post Treatment Position bed  -MC     In Bed sitting EOB;with family/caregiver  -           User Key  (r) = Recorded By, (t) = Taken By, (c) = Cosigned By    Initials Name Provider Type    Rosa Rios, OT Occupational Therapist               Outcome Measures     Row Name 03/13/23 1058          How much help from another is currently needed...    Putting on and taking off regular lower body clothing? 4  -MC     Bathing (including washing, rinsing, and drying) 4  -MC     Toileting (which includes using toilet bed pan or urinal) 4  -MC     Putting on and taking off regular upper body clothing 4  -MC     Taking care of personal grooming (such as brushing teeth) 4  -MC     Eating meals 4  -MC     AM-PAC 6 Clicks Score (OT) 24  -MC     Row Name 03/13/23 1048          How much help from another person do you currently need...    Turning from your back to your side while in flat bed without using bedrails? 4  -KD      Moving from lying on back to sitting on the side of a flat bed without bedrails? 4  -KD     Moving to and from a bed to a chair (including a wheelchair)? 4  -KD     Standing up from a chair using your arms (e.g., wheelchair, bedside chair)? 4  -KD     Climbing 3-5 steps with a railing? 4  -KD     To walk in hospital room? 4  -KD     AM-PAC 6 Clicks Score (PT) 24  -KD     Highest level of mobility 8 --> Walked 250 feet or more  -KD     Row Name 03/13/23 1058          Functional Assessment    Outcome Measure Options AM-PAC 6 Clicks Daily Activity (OT)  -           User Key  (r) = Recorded By, (t) = Taken By, (c) = Cosigned By    Initials Name Provider Type    KD Day, Deanna CHOWDHURY, RN Registered Nurse    Rosa iRos OT Occupational Therapist              Occupational Therapy Education     Title: PT OT SLP Therapies (In Progress)     Topic: Occupational Therapy (In Progress)     Point: ADL training (Done)     Description:   Instruct learner(s) on proper safety adaptation and remediation techniques during self care or transfers.   Instruct in proper use of assistive devices.              Learning Progress Summary           Patient Acceptance, E,TB, VU by  at 3/13/2023 1123    Comment: OT role, POC                   Point: Home exercise program (Not Started)     Description:   Instruct learner(s) on appropriate technique for monitoring, assisting and/or progressing therapeutic exercises/activities.              Learner Progress:  Not documented in this visit.          Point: Precautions (Not Started)     Description:   Instruct learner(s) on prescribed precautions during self-care and functional transfers.              Learner Progress:  Not documented in this visit.          Point: Body mechanics (Not Started)     Description:   Instruct learner(s) on proper positioning and spine alignment during self-care, functional mobility activities and/or exercises.              Learner Progress:  Not documented in this  visit.                      User Key     Initials Effective Dates Name Provider Type Discipline     10/19/22 -  Rosa Parkinson OT Occupational Therapist OT              OT Recommendation and Plan  Therapy Frequency (OT): evaluation only  Plan of Care Review  Plan of Care Reviewed With: patient, spouse  Progress: no change  Outcome Evaluation: OT eval completed, co-eval with PT. Pt standing bedside upon entering and agreeable to the session. Pt is mod I for bed mobility with HOB elevated. Pt is independent for sit to stand t/f and for fxnl mobility in the hallway. Pt is independent for donning/doffing socks while sitting EOB. Pt BUE MMT 4/5 grossly. Pt reports that she is at baseline fxn, OT to sign off at this time. Recommend d/c home.  Plan of Care Reviewed With: patient, spouse  Outcome Evaluation: OT eval completed, co-eval with PT. Pt standing bedside upon entering and agreeable to the session. Pt is mod I for bed mobility with HOB elevated. Pt is independent for sit to stand t/f and for fxnl mobility in the hallway. Pt is independent for donning/doffing socks while sitting EOB. Pt BUE MMT 4/5 grossly. Pt reports that she is at baseline fxn, OT to sign off at this time. Recommend d/c home.     Time Calculation:    Time Calculation- OT     Row Name 03/13/23 1058             Time Calculation- OT    OT Start Time 1058  -      OT Stop Time 1124  -      OT Time Calculation (min) 26 min  -      OT Received On 03/13/23  -      OT Goal Re-Cert Due Date 03/26/23  -         Untimed Charges    OT Eval/Re-eval Minutes 26  -MC         Total Minutes    Untimed Charges Total Minutes 26  -MC       Total Minutes 26  -MC            User Key  (r) = Recorded By, (t) = Taken By, (c) = Cosigned By    Initials Name Provider Type     Rosa Parkinson OT Occupational Therapist              Therapy Charges for Today     Code Description Service Date Service Provider Modifiers Qty    [id number]  OT CLYDEAL LOW  COMPLEXITY 2 3/13/2023 Rosa Parkinson, OT GO 1                  Rosa Parkinson, OT  3/13/2023

## 2023-03-13 NOTE — PLAN OF CARE
Goal Outcome Evaluation:           Progress: no change  Outcome Evaluation: No changes overnight, pt NPO for Lexiscan this AM; VSS

## 2023-03-13 NOTE — PLAN OF CARE
Goal Outcome Evaluation:  Plan of Care Reviewed With: patient           Outcome Evaluation: Initial PT evaluation complete, co-evaluation with OT. Patient is alert, cooperative.  She demonstrates (I) with bed mobility, transfers and gait, ambulating 150'x1 without an AD, no LOB, fast leia.  Tinetti assessed: 26/28 or low fall risk, FWW not necessary.  Patient is at PLOF, demonstrates (I) with functional mobility and is at low risk for falls.  No further PT indicated, skilled PT discharged, RN updated.

## 2023-03-14 LAB
BH CV REST NUCLEAR ISOTOPE DOSE: 11 MCI
BH CV STRESS BP STAGE 1: NORMAL
BH CV STRESS COMMENTS STAGE 1: NORMAL
BH CV STRESS DOSE REGADENOSON STAGE 1: 0.4
BH CV STRESS DURATION MIN STAGE 1: 0
BH CV STRESS DURATION SEC STAGE 1: 10
BH CV STRESS HR STAGE 1: 77
BH CV STRESS NUCLEAR ISOTOPE DOSE: 37.4 MCI
BH CV STRESS PROTOCOL 1: NORMAL
BH CV STRESS RECOVERY BP: NORMAL MMHG
BH CV STRESS RECOVERY HR: 74 BPM
BH CV STRESS STAGE 1: 1
LV EF NUC BP: 70 %
MAXIMAL PREDICTED HEART RATE: 141 BPM
PERCENT MAX PREDICTED HR: 56.74 %
STRESS BASELINE BP: NORMAL MMHG
STRESS BASELINE HR: 63 BPM
STRESS PERCENT HR: 67 %
STRESS POST ESTIMATED WORKLOAD: 1 METS
STRESS POST PEAK BP: NORMAL MMHG
STRESS POST PEAK HR: 80 BPM
STRESS TARGET HR: 120 BPM

## 2023-03-15 LAB
QT INTERVAL: 414 MS
QTC INTERVAL: 385 MS

## 2023-03-17 LAB
QT INTERVAL: 356 MS
QT INTERVAL: 368 MS
QTC INTERVAL: 386 MS
QTC INTERVAL: 410 MS

## 2023-03-21 ENCOUNTER — OFFICE VISIT (OUTPATIENT)
Dept: FAMILY MEDICINE CLINIC | Facility: CLINIC | Age: 78
End: 2023-03-21
Payer: MEDICARE

## 2023-03-21 VITALS
BODY MASS INDEX: 32.69 KG/M2 | OXYGEN SATURATION: 98 % | HEIGHT: 67 IN | SYSTOLIC BLOOD PRESSURE: 130 MMHG | HEART RATE: 45 BPM | DIASTOLIC BLOOD PRESSURE: 76 MMHG | WEIGHT: 208.3 LBS

## 2023-03-21 DIAGNOSIS — Z09 HOSPITAL DISCHARGE FOLLOW-UP: Primary | ICD-10-CM

## 2023-03-21 DIAGNOSIS — R00.1 BRADYCARDIA: ICD-10-CM

## 2023-03-21 DIAGNOSIS — I10 HYPERTENSION, UNSPECIFIED TYPE: ICD-10-CM

## 2023-03-21 RX ORDER — LISINOPRIL 20 MG/1
20 TABLET ORAL DAILY
Qty: 90 TABLET | Refills: 1 | Status: SHIPPED | OUTPATIENT
Start: 2023-03-21

## 2023-03-21 RX ORDER — METOPROLOL TARTRATE 50 MG/1
50 TABLET, FILM COATED ORAL EVERY 12 HOURS SCHEDULED
Qty: 60 TABLET | Refills: 2 | Status: SHIPPED | OUTPATIENT
Start: 2023-03-21 | End: 2023-04-20

## 2023-03-21 NOTE — PROGRESS NOTES
Subjective   Larisa Vazquez is a 77 y.o. female.   ,Cc: follow up of chronic medical issues    Hypertension  This is a chronic problem. The current episode started more than 1 year ago. The problem has been gradually improving since onset. Associated symptoms include peripheral edema. Pertinent negatives include no anxiety, blurred vision, chest pain, headaches, malaise/fatigue, neck pain, orthopnea, palpitations, PND, shortness of breath or sweats. Current antihypertension treatment includes ACE inhibitors, beta blockers and calcium channel blockers.     The patient comes in for a hospital follow up.She was admitted for evaluation of chest pain. She was determined not to have CAD. She had elevated BP and was started on Metoprolol and Amlodipine. She is having lower extremity edema.She has no c/o orthopnea.   The following portions of the patient's history were reviewed and updated as appropriate: allergies, current medications, past family history, past medical history, past surgical history and problem list.    Review of Systems   Constitutional: Negative for activity change, appetite change, chills, diaphoresis, fatigue, fever, malaise/fatigue and unexpected weight change.   HENT: Positive for postnasal drip. Negative for congestion, dental problem, ear discharge, ear pain, facial swelling, hearing loss, mouth sores, nosebleeds, rhinorrhea, sinus pressure, sinus pain, sneezing, sore throat, tinnitus, trouble swallowing and voice change.    Eyes: Negative for blurred vision, photophobia, pain, discharge, redness, itching and visual disturbance.   Respiratory: Negative for apnea, cough, choking, chest tightness, shortness of breath and wheezing.    Cardiovascular: Positive for leg swelling. Negative for chest pain, palpitations, orthopnea and PND.   Gastrointestinal: Negative for abdominal distention, abdominal pain, anal bleeding, blood in stool, constipation, diarrhea, nausea, rectal pain and vomiting.    Endocrine: Negative for cold intolerance, heat intolerance, polydipsia, polyphagia and polyuria.   Genitourinary: Positive for frequency. Negative for decreased urine volume, difficulty urinating, dysuria, enuresis, flank pain, genital sores, hematuria, pelvic pain, urgency, vaginal bleeding, vaginal discharge and vaginal pain.   Musculoskeletal: Positive for joint swelling. Negative for arthralgias, back pain, gait problem, myalgias, neck pain and neck stiffness.   Skin: Negative for color change and rash.   Allergic/Immunologic: Negative for environmental allergies, food allergies and immunocompromised state.   Neurological: Positive for light-headedness. Negative for dizziness, tremors, seizures, syncope, speech difficulty, weakness, numbness and headaches.   Hematological: Negative for adenopathy. Bruises/bleeds easily.   Psychiatric/Behavioral: Negative for agitation, behavioral problems, confusion, decreased concentration, dysphoric mood, hallucinations, self-injury, sleep disturbance and suicidal ideas. The patient is not nervous/anxious and is not hyperactive.        Objective   Physical Exam  Vitals reviewed.   Constitutional:       Appearance: Normal appearance.   HENT:      Head: Normocephalic and atraumatic.      Right Ear: Tympanic membrane, ear canal and external ear normal.      Left Ear: Tympanic membrane, ear canal and external ear normal.      Nose: Nose normal.      Mouth/Throat:      Mouth: Mucous membranes are moist.      Pharynx: Oropharynx is clear.   Cardiovascular:      Rate and Rhythm: Normal rate and regular rhythm.      Heart sounds: Normal heart sounds. No murmur heard.    No friction rub. No gallop.   Pulmonary:      Effort: Pulmonary effort is normal. No respiratory distress.      Breath sounds: Normal breath sounds. No stridor. No wheezing, rhonchi or rales.   Chest:      Chest wall: No tenderness.   Abdominal:      General: Bowel sounds are normal. There is no distension.       "Palpations: Abdomen is soft. There is no mass.      Tenderness: There is no abdominal tenderness. There is no guarding.      Hernia: No hernia is present.   Musculoskeletal:      Cervical back: Normal range of motion and neck supple.   Skin:     General: Skin is warm and dry.   Neurological:      Mental Status: She is alert.           Visit Vitals  /76 (BP Location: Left arm)   Pulse (!) 45   Ht 170.2 cm (67.01\")   Wt 94.5 kg (208 lb 4.8 oz)   SpO2 98%   BMI 32.61 kg/m²     Body mass index is 32.61 kg/m².      Assessment/Plan   Diagnoses and all orders for this visit:    1. Hospital discharge follow-up (Primary)    2. Hypertension, unspecified type    3. Bradycardia    Other orders  -     metoprolol tartrate (LOPRESSOR) 50 MG tablet; Take 1 tablet by mouth Every 12 (Twelve) Hours for 30 days.  Dispense: 60 tablet; Refill: 2  -     lisinopril (PRINIVIL,ZESTRIL) 20 MG tablet; Take 1 tablet by mouth Daily.  Dispense: 90 tablet; Refill: 1    Have discontinued the Amlodipine.  Return to the clinic in 1 month/s.  Will contact with results as needed.  I reviewed the CBC,CMP,and Lipid Profile with the patient.            This document has been electronically signed by Reyes Yusuf MD on March 21, 2023 10:11 CDT    "

## 2023-03-29 ENCOUNTER — TELEPHONE (OUTPATIENT)
Dept: FAMILY MEDICINE CLINIC | Facility: CLINIC | Age: 78
End: 2023-03-29
Payer: MEDICARE

## 2023-03-29 NOTE — TELEPHONE ENCOUNTER
Mrs Vazquez is calling per your instruction when you saw her on 3-21-23 to let you know how she is doing.   She would like a call back @ [phone].

## 2023-04-20 ENCOUNTER — OFFICE VISIT (OUTPATIENT)
Dept: FAMILY MEDICINE CLINIC | Facility: CLINIC | Age: 78
End: 2023-04-20
Payer: MEDICARE

## 2023-04-20 VITALS
OXYGEN SATURATION: 97 % | WEIGHT: 205.2 LBS | DIASTOLIC BLOOD PRESSURE: 78 MMHG | SYSTOLIC BLOOD PRESSURE: 132 MMHG | BODY MASS INDEX: 32.21 KG/M2 | HEART RATE: 58 BPM | HEIGHT: 67 IN

## 2023-04-20 DIAGNOSIS — R00.1 BRADYCARDIA: ICD-10-CM

## 2023-04-20 DIAGNOSIS — I10 PRIMARY HYPERTENSION: Primary | ICD-10-CM

## 2023-04-20 PROCEDURE — 3075F SYST BP GE 130 - 139MM HG: CPT | Performed by: FAMILY MEDICINE

## 2023-04-20 PROCEDURE — 1160F RVW MEDS BY RX/DR IN RCRD: CPT | Performed by: FAMILY MEDICINE

## 2023-04-20 PROCEDURE — 3078F DIAST BP <80 MM HG: CPT | Performed by: FAMILY MEDICINE

## 2023-04-20 PROCEDURE — 1159F MED LIST DOCD IN RCRD: CPT | Performed by: FAMILY MEDICINE

## 2023-04-20 PROCEDURE — 99214 OFFICE O/P EST MOD 30 MIN: CPT | Performed by: FAMILY MEDICINE

## 2023-04-20 RX ORDER — ASPIRIN 81 MG/1
81 TABLET ORAL DAILY
COMMUNITY

## 2023-04-20 NOTE — PROGRESS NOTES
Subjective   Larisa Vazquez is a 77 y.o. female.   Cc: Bradycardia  Hypertension  This is a chronic problem. The current episode started more than 1 year ago. The problem has been gradually improving since onset. Associated symptoms include peripheral edema. Pertinent negatives include no anxiety, blurred vision, chest pain, headaches, malaise/fatigue, neck pain, orthopnea, palpitations, PND, shortness of breath or sweats. Current antihypertension treatment includes ACE inhibitors and beta blockers.   She has been having Bradycardia. I reviewed several BP readings and Pulses and the BP was controlled fairly well, but the pulse was bradycardic. She had some symptoms of being tired after cleaning her porch.    The following portions of the patient's history were reviewed and updated as appropriate: allergies, current medications, past family history, past medical history, past social history, past surgical history and problem list.\    Review of Systems   Constitutional: Negative for malaise/fatigue.   Eyes: Negative for blurred vision.   Respiratory: Negative for shortness of breath.    Cardiovascular: Negative for chest pain, palpitations, orthopnea and PND.   Musculoskeletal: Negative for neck pain.   Neurological: Negative for headaches.       Objective   Physical Exam  Vitals reviewed.   Constitutional:       Appearance: Normal appearance.   HENT:      Head: Normocephalic and atraumatic.      Right Ear: Tympanic membrane, ear canal and external ear normal.      Left Ear: Tympanic membrane, ear canal and external ear normal.      Nose: Nose normal.      Mouth/Throat:      Mouth: Mucous membranes are moist.      Pharynx: Oropharynx is clear.   Cardiovascular:      Rate and Rhythm: Normal rate and regular rhythm.      Heart sounds: Normal heart sounds. No murmur heard.    No friction rub. No gallop.   Pulmonary:      Effort: Pulmonary effort is normal. No respiratory distress.      Breath sounds: Normal breath  "sounds. No stridor. No wheezing, rhonchi or rales.   Chest:      Chest wall: No tenderness.   Abdominal:      General: Bowel sounds are normal. There is no distension.      Palpations: Abdomen is soft. There is no mass.      Tenderness: There is no abdominal tenderness. There is no guarding or rebound.      Hernia: No hernia is present.   Musculoskeletal:      Cervical back: Normal range of motion.   Skin:     General: Skin is warm and dry.   Neurological:      Mental Status: She is alert.           Visit Vitals  /78 (BP Location: Left arm, Patient Position: Sitting, Cuff Size: Adult)   Pulse 58   Ht 170.2 cm (67\")   Wt 93.1 kg (205 lb 3.2 oz)   SpO2 97%   BMI 32.14 kg/m²     Body mass index is 32.14 kg/m².      Assessment/Plan   Diagnoses and all orders for this visit:    1. Primary hypertension (Primary)    2. Bradycardia    I reviewed her Blood Pressure readings and Pulse readings with the patient.  Bradycardia and Hypertension are stable. Will decrease Metoprolol to 50 mg daily  Continue other Antihypertensive medications.  She is to monitor her BP and pulse. Will let me know how well things work reducing the Metoprolol.  Return in 2 months.        This document has been electronically signed by Reyes Yusuf MD on April 20, 2023 09:25 CDT    "

## 2023-06-19 ENCOUNTER — OFFICE VISIT (OUTPATIENT)
Dept: FAMILY MEDICINE CLINIC | Facility: CLINIC | Age: 78
End: 2023-06-19
Payer: MEDICARE

## 2023-06-19 VITALS
BODY MASS INDEX: 31.24 KG/M2 | WEIGHT: 199.06 LBS | HEIGHT: 67 IN | DIASTOLIC BLOOD PRESSURE: 72 MMHG | OXYGEN SATURATION: 97 % | SYSTOLIC BLOOD PRESSURE: 138 MMHG | HEART RATE: 53 BPM

## 2023-06-19 DIAGNOSIS — R00.1 BRADYCARDIA: ICD-10-CM

## 2023-06-19 DIAGNOSIS — E55.9 VITAMIN D INSUFFICIENCY: ICD-10-CM

## 2023-06-19 DIAGNOSIS — I10 PRIMARY HYPERTENSION: Primary | ICD-10-CM

## 2023-06-19 DIAGNOSIS — H91.92 DECREASED HEARING OF LEFT EAR: ICD-10-CM

## 2023-06-19 PROCEDURE — 99214 OFFICE O/P EST MOD 30 MIN: CPT | Performed by: FAMILY MEDICINE

## 2023-06-19 PROCEDURE — 1159F MED LIST DOCD IN RCRD: CPT | Performed by: FAMILY MEDICINE

## 2023-06-19 PROCEDURE — 1160F RVW MEDS BY RX/DR IN RCRD: CPT | Performed by: FAMILY MEDICINE

## 2023-06-19 PROCEDURE — 3078F DIAST BP <80 MM HG: CPT | Performed by: FAMILY MEDICINE

## 2023-06-19 PROCEDURE — 3075F SYST BP GE 130 - 139MM HG: CPT | Performed by: FAMILY MEDICINE

## 2023-06-19 RX ORDER — METOPROLOL TARTRATE 50 MG/1
25 TABLET, FILM COATED ORAL DAILY
Qty: 45 TABLET | Refills: 1 | Status: SHIPPED | OUTPATIENT
Start: 2023-06-19

## 2023-06-19 NOTE — PROGRESS NOTES
Subjective   Larisa Vazquez is a 77 y.o. female.   Cc: follow up of chronic medical issues    Hypertension  This is a chronic problem. The current episode started more than 1 year ago. The problem has been gradually improving since onset. Pertinent negatives include no anxiety, blurred vision, chest pain, headaches, malaise/fatigue, neck pain, orthopnea, palpitations, peripheral edema, PND, shortness of breath or sweats.   She has been having some issue with Bradycardia. It has been doing slightly better. The BP is doing fairly well.  She has a history of Vitamin D Deficiency. She is not taking the supplement.  She has been having difficulty with her hearing. It haw been going on for 6 months. It involves the left ear.  The following portions of the patient's history were reviewed and updated as appropriate: allergies, current medications, past family history, past medical history, past social history, past surgical history, and problem list.    Review of Systems   Constitutional:  Negative for fatigue, fever and malaise/fatigue.   Eyes:  Negative for blurred vision.   Respiratory:  Negative for shortness of breath.    Cardiovascular:  Negative for chest pain, palpitations, orthopnea and PND.   Musculoskeletal:  Negative for neck pain.   Neurological:  Negative for headaches.     Objective   Physical Exam  Vitals reviewed.   Constitutional:       Appearance: Normal appearance.   HENT:      Head: Normocephalic and atraumatic.      Right Ear: Tympanic membrane, ear canal and external ear normal.      Left Ear: Tympanic membrane, ear canal and external ear normal.      Nose: Nose normal.      Mouth/Throat:      Mouth: Mucous membranes are moist.      Pharynx: Oropharynx is clear.   Cardiovascular:      Rate and Rhythm: Normal rate and regular rhythm.      Heart sounds: Normal heart sounds. No murmur heard.    No friction rub. No gallop.   Pulmonary:      Effort: Pulmonary effort is normal. No respiratory distress.       "Breath sounds: Normal breath sounds. No stridor. No wheezing, rhonchi or rales.   Chest:      Chest wall: No tenderness.   Abdominal:      General: Bowel sounds are normal. There is no distension.      Palpations: Abdomen is soft. There is no mass.      Tenderness: There is no abdominal tenderness. There is no guarding or rebound.      Hernia: No hernia is present.   Skin:     General: Skin is warm and dry.   Neurological:      Mental Status: She is alert.         Visit Vitals  /72   Pulse 53   Ht 170.2 cm (67\")   Wt 90.3 kg (199 lb 1 oz)   LMP 06/15/1989 (Within Months)   SpO2 97%   Breastfeeding No   BMI 31.18 kg/m²     Body mass index is 31.18 kg/m².      Assessment/Plan   Diagnoses and all orders for this visit:    1. Primary hypertension (Primary)  -     metoprolol tartrate (LOPRESSOR) 50 MG tablet; Take 0.5 tablets by mouth Daily.  Dispense: 45 tablet; Refill: 1  -     Lipid Panel; Future  -     Comprehensive metabolic panel; Future  -     Lipid Panel; Future  -     TSH; Future  -     T4, free; Future    2. Vitamin D insufficiency  -     Lipid Panel; Future  -     Comprehensive metabolic panel; Future  -     Lipid Panel; Future  -     TSH; Future  -     T4, free; Future  -     Vitamin D 25 hydroxy; Future    3. Bradycardia  -     Lipid Panel; Future  -     Comprehensive metabolic panel; Future  -     Lipid Panel; Future  -     TSH; Future  -     T4, free; Future    4. Decreased hearing of left ear  -     Ambulatory Referral to ENT (Otolaryngology)    I reviewed the CBC,CMP,and Lipid Profile (03/11/23) with the patient.  The above medical issues are stable. Will continue on current medications.  Return to the clinic in 3 month/s.  Will contact with results as needed.            This document has been electronically signed by Reyes Yusuf MD on June 19, 2023 10:45 CDT    "

## 2023-08-15 ENCOUNTER — OFFICE VISIT (OUTPATIENT)
Dept: OTOLARYNGOLOGY | Facility: CLINIC | Age: 78
End: 2023-08-15
Payer: MEDICARE

## 2023-08-15 ENCOUNTER — CLINICAL SUPPORT (OUTPATIENT)
Dept: AUDIOLOGY | Facility: CLINIC | Age: 78
End: 2023-08-15
Payer: MEDICARE

## 2023-08-15 VITALS — HEART RATE: 65 BPM | WEIGHT: 198.6 LBS | BODY MASS INDEX: 31.17 KG/M2 | HEIGHT: 67 IN | OXYGEN SATURATION: 96 %

## 2023-08-15 DIAGNOSIS — J31.0 CHRONIC RHINITIS: ICD-10-CM

## 2023-08-15 DIAGNOSIS — H65.22 CHRONIC SEROUS OTITIS MEDIA OF LEFT EAR: Primary | ICD-10-CM

## 2023-08-15 DIAGNOSIS — H90.A32 MIXED CONDUCTIVE AND SENSORINEURAL HEARING LOSS OF LEFT EAR WITH RESTRICTED HEARING OF RIGHT EAR: ICD-10-CM

## 2023-08-15 DIAGNOSIS — H90.A32 MIXED CONDUCTIVE AND SENSORINEURAL HEARING LOSS OF LEFT EAR WITH RESTRICTED HEARING OF RIGHT EAR: Primary | ICD-10-CM

## 2023-08-15 DIAGNOSIS — Z77.122 HISTORY OF EXPOSURE TO NOISE: ICD-10-CM

## 2023-08-15 DIAGNOSIS — H69.82 DYSFUNCTION OF LEFT EUSTACHIAN TUBE: ICD-10-CM

## 2023-08-15 DIAGNOSIS — H93.13 TINNITUS OF BOTH EARS: ICD-10-CM

## 2023-08-15 PROCEDURE — 99203 OFFICE O/P NEW LOW 30 MIN: CPT | Performed by: OTOLARYNGOLOGY

## 2023-08-15 PROCEDURE — 1159F MED LIST DOCD IN RCRD: CPT | Performed by: OTOLARYNGOLOGY

## 2023-08-15 PROCEDURE — 1160F RVW MEDS BY RX/DR IN RCRD: CPT | Performed by: OTOLARYNGOLOGY

## 2023-08-15 PROCEDURE — 92567 TYMPANOMETRY: CPT | Performed by: AUDIOLOGIST

## 2023-08-15 PROCEDURE — 92557 COMPREHENSIVE HEARING TEST: CPT | Performed by: AUDIOLOGIST

## 2023-08-15 RX ORDER — FLUTICASONE PROPIONATE 50 MCG
2 SPRAY, SUSPENSION (ML) NASAL DAILY
Qty: 16 G | Refills: 11 | Status: SHIPPED | OUTPATIENT
Start: 2023-08-15

## 2023-08-15 NOTE — PROGRESS NOTES
Subjective   Larisa Vazquez is a 77 y.o. female.       History of Present Illness   Patient reports her hearing is decreased on the left more so than the right.  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  Has binaural tinnitus which is longstanding.  She cannot remember how long her left ear has been worse than the right.  Has rhinorrhea especially in the morning that is nonpurulent.  Has not had a lot of trouble with her ears in the past.  No otorrhea.  No previous otologic surgery      The following portions of the patient's history were reviewed and updated as appropriate: allergies, current medications, past family history, past medical history, past social history, past surgical history and problem list.     reports that she has never smoked. She has been exposed to tobacco smoke. She has never used smokeless tobacco. She reports that she does not drink alcohol and does not use drugs.   Patient is not a tobacco user and has not been counseled for use of tobacco products      Review of Systems        Objective   Physical Exam  Ears: External ears no deformity.  Canals no discharge.  Right tympanic membrane intact and clear.  Left tympanic membrane intact with serous effusion with air bubbles.  Nares boggy mucosa no discharge  Oral cavity no masses or lesions  Pharynx no erythema exudate or mass  Neck no adenopathy or mass    Audiogram is obtained and reviewed and shows a bilateral high-frequency sensorineural hearing loss starting at 1000 Hz with an additional conductive component on the left creating a mixed loss on the left.  Tympanograms type a on the right flat on the left.  Discrimination is 80% on the right 84% on the left       Assessment and Plan   Diagnoses and all orders for this visit:    1. Chronic serous otitis media of left ear (Primary)    2. Mixed conductive and sensorineural hearing loss of left ear with restricted hearing of right ear    3. Chronic rhinitis    Other orders  -      fluticasone (FLONASE) 50 MCG/ACT nasal spray; 2 sprays into the nostril(s) as directed by provider Daily.  Dispense: 16 g; Refill: 11             Plan: Explained findings to the patient.  We will try medical therapy with Flonase 2 sprays each nostril daily as well as instructions to try to insufflate her ear 3-4 times a day.  Recheck in 6 weeks.  If no better can consider therapeutic myringotomy.

## 2023-08-15 NOTE — PROGRESS NOTES
STANDARD AUDIOMETRIC EVALUATION      Name:  Larisa Vazquez  :  1945  Age:  77 y.o.  Date of Evaluation:  8/15/2023      HISTORY    Reason for visit:  Larisa Vazquez is seen today for a hearing test at the request of Dr. Art Whitfield and Dr. Reyes Yusuf .  Patient reports she is not hearing well in her left ear for awhile, and she's had tinnitus in both ears for a long time.  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       EVALUATION    See Audiogram    RESULTS        Otoscopy and Tympanometry 226 Hz :  Right Ear:  Otoscopy:  Clear ear canal          Tympanometry:  Middle ear function within normal limits    Left Ear:   Otoscopy:  Clear ear canal        Tympanometry:  Reduced pressure and compliance     Test technique:  Standard Audiometry     Pure Tone Audiometry:   Patient responded to pure tones at 20-75 dB for 250-8000 Hz in right ear, and at  dB for 250-6000 Hz in left ear.       Speech Audiometry:        Right Ear:  Speech Reception Threshold (SRT) was obtained at 25 dBHL                 Speech Discrimination scores were 80% in quiet when words were presented at 65 dBHL       Left Ear:  Speech Reception Threshold (SRT) was obtained at 40 dBHL                 Speech Discrimination scores were 84% in quiet when words were presented at 80 dBHL    Reliability:   good    IMPRESSIONS:  1.  Tympanometry results are consistent with Middle ear function within normal limits in right ear, and Reduced pressure and compliance  in left ear.  2.  Pure tone results are consistent with within normal limits to moderately severe sloping sensorineural hearing loss for right ear, and mild to profound sloping mixed hearing loss  in left ear.       RECOMMENDATIONS:  Patient is seeing the Ear Nose and Throat physician immediately following this examination.  It was a pleasure seeing Larisa Vazquez in Audiology today.  We would be happy to do further testing or discuss these test as  necessary.          This document has been electronically signed by Rosette Webster MS CCC-A on August 15, 2023 10:58 CDT       Rosette Webster MS CCC-A  Licensed Audiologist

## 2023-08-25 RX ORDER — LISINOPRIL 20 MG/1
20 TABLET ORAL DAILY
Qty: 90 TABLET | Refills: 1 | Status: SHIPPED | OUTPATIENT
Start: 2023-08-25

## 2023-08-25 NOTE — TELEPHONE ENCOUNTER
UPCOMING APPTS  With Family Medicine (Reyes Yusuf MD)  09/19/2023 at 8:30 AM  LAST OFFICE VISIT - THIS DEPT  6/19/2023 Reyes Yusuf MD

## 2023-09-19 ENCOUNTER — OFFICE VISIT (OUTPATIENT)
Dept: FAMILY MEDICINE CLINIC | Facility: CLINIC | Age: 78
End: 2023-09-19
Payer: MEDICARE

## 2023-09-19 VITALS
WEIGHT: 196.19 LBS | HEIGHT: 67 IN | OXYGEN SATURATION: 98 % | DIASTOLIC BLOOD PRESSURE: 62 MMHG | HEART RATE: 51 BPM | SYSTOLIC BLOOD PRESSURE: 118 MMHG | BODY MASS INDEX: 30.79 KG/M2

## 2023-09-19 DIAGNOSIS — R00.1 BRADYCARDIA: ICD-10-CM

## 2023-09-19 DIAGNOSIS — I10 PRIMARY HYPERTENSION: ICD-10-CM

## 2023-09-19 DIAGNOSIS — Z00.00 MEDICARE ANNUAL WELLNESS VISIT, SUBSEQUENT: Primary | ICD-10-CM

## 2023-09-19 PROCEDURE — 3074F SYST BP LT 130 MM HG: CPT | Performed by: FAMILY MEDICINE

## 2023-09-19 PROCEDURE — G0439 PPPS, SUBSEQ VISIT: HCPCS | Performed by: FAMILY MEDICINE

## 2023-09-19 PROCEDURE — 1170F FXNL STATUS ASSESSED: CPT | Performed by: FAMILY MEDICINE

## 2023-09-19 PROCEDURE — 3078F DIAST BP <80 MM HG: CPT | Performed by: FAMILY MEDICINE

## 2023-09-19 RX ORDER — METOPROLOL TARTRATE 50 MG/1
25 TABLET, FILM COATED ORAL DAILY
Qty: 45 TABLET | Refills: 1 | Status: CANCELLED | OUTPATIENT
Start: 2023-09-19

## 2023-09-19 NOTE — PROGRESS NOTES
The ABCs of the Annual Wellness Visit  Subsequent Medicare Wellness Visit  Cc: medicare wellness  Subjective    Larisa Vazquez is a 77 y.o. female who presents for a Subsequent Medicare Wellness Visit.  The patient comes in for her [facility]wellable wellness. Her BP is under good control. She is still bradycardic. She is weaning off of her Betablocker.  The following portions of the patient's history were reviewed and   updated as appropriate: allergies, current medications, past family history, past medical history, past social history, past surgical history, and problem list.    Compared to one year ago, the patient feels her physical   health is the same.    Compared to one year ago, the patient feels her mental   health is the same.    Recent Hospitalizations:  This patient has had a [facility] admission record on file within the last 365 days.    Current Medical Providers:  Patient Care Team:  Reyes Yusuf MD as PCP - General (Family Medicine)    Outpatient Medications Prior to Visit   Medication Sig Dispense Refill    fluticasone (FLONASE) 50 MCG/ACT nasal spray 2 sprays into the nostril(s) as directed by provider Daily. 16 g 11    lisinopril (PRINIVIL,ZESTRIL) 20 MG tablet Take 1 tablet by mouth Daily. 90 tablet 1    metoprolol tartrate (LOPRESSOR) 50 MG tablet Take 0.5 tablets by mouth Daily. 45 tablet 1     No facility-administered medications prior to visit.       No opioid medication identified on active medication list. I have reviewed chart for other potential  high risk medication/s and harmful drug interactions in the elderly.        Aspirin is not on active medication list.  Aspirin use is not indicated based on review of current medical condition/s. Risk of harm outweighs potential benefits.  .    Patient Active Problem List   Diagnosis    Chest pain, unspecified type    Primary hypertension    Bradycardia     Advance Care Planning   Advance Care Planning     Advance Directive is not on file.   "ACP discussion was held with the patient during this visit. Patient does not have an advance directive, information provided.     Objective    Vitals:    23 0840   BP: 118/62   Pulse: 51   SpO2: 98%   Weight: 89 kg (196 lb 3 oz)   Height: 170.2 cm (67\")     Estimated body mass index is 30.73 kg/m² as calculated from the following:    Height as of this encounter: 170.2 cm (67\").    Weight as of this encounter: 89 kg (196 lb 3 oz).           Does the patient have evidence of cognitive impairment? No          HEALTH RISK ASSESSMENT    Smoking Status:  Social History     Tobacco Use   Smoking Status Never    Passive exposure: Past   Smokeless Tobacco Never     Alcohol Consumption:  Social History     Substance and Sexual Activity   Alcohol Use Never     Fall Risk Screen:    JESUS Fall Risk Assessment was completed, and patient is at LOW risk for falls.Assessment completed on:2023    Depression Screenin/19/2023     8:42 AM   PHQ-2/PHQ-9 Depression Screening   Little Interest or Pleasure in Doing Things 0-->not at all   Feeling Down, Depressed or Hopeless 0-->not at all   PHQ-9: Brief Depression Severity Measure Score 0       Health Habits and Functional and Cognitive Screenin/19/2023     9:47 AM   Functional & Cognitive Status   Do you have difficulty preparing food and eating? No   Do you have difficulty bathing yourself, getting dressed or grooming yourself? No   Do you have difficulty using the toilet? No   Do you have difficulty moving around from place to place? No   Do you have trouble with steps or getting out of a bed or a chair? No   Current Diet Frequent Junk Food   Dental Exam Up to date   Eye Exam Up to date   Exercise (times per week) 3 times per week   Current Exercises Include House Cleaning;Yard Work;Walking   Do you need help using the phone?  No   Are you deaf or do you have serious difficulty hearing?  No   Do you need help to go to places out of walking distance? No   Do " you need help shopping? No   Do you need help preparing meals?  No   Do you need help with housework?  No   Do you need help with laundry? No   Do you need help taking your medications? No   Do you need help managing money? No   Do you ever drive or ride in a car without wearing a seat belt? No   Have you felt unusual stress, anger or loneliness in the last month? Yes   Who do you live with? Spouse   If you need help, do you have trouble finding someone available to you? No   Have you been bothered in the last four weeks by sexual problems? No   Do you have difficulty concentrating, remembering or making decisions? No       Age-appropriate Screening Schedule:  Refer to the list below for future screening recommendations based on patient's age, sex and/or medical conditions. Orders for these recommended tests are listed in the plan section. The patient has been provided with a written plan.    Health Maintenance   Topic Date Due    DXA SCAN  Never done    TDAP/TD VACCINES (1 - Tdap) Never done    ZOSTER VACCINE (1 of 2) Never done    COVID-19 Vaccine (5 - Pfizer series) 02/27/2023    HEPATITIS C SCREENING  Never done    INFLUENZA VACCINE  10/01/2023    BMI FOLLOWUP  03/21/2024    ANNUAL WELLNESS VISIT  09/19/2024    Pneumococcal Vaccine 65+  Completed                  CMS Preventative Services Quick Reference  Risk Factors Identified During Encounter  Fall Risk-High or Moderate: Discussed Fall Prevention in the home  The above risks/problems have been discussed with the patient.  Pertinent information has been shared with the patient in the After Visit Summary.  An After Visit Summary and PPPS were made available to the patient.    Follow Up:   Next Medicare Wellness visit to be scheduled in 1 year.       Additional E&M Note during same encounter follows:  Patient has multiple medical problems which are significant and separately identifiable that require additional work above and beyond the Medicare Wellness Visit.   "    Chief Complaint  Follow-up, Slow Heart Rate, and Hypertension    Subjective        Hypertension  This is a chronic problem. The current episode started more than 1 year ago. The problem has been gradually improving since onset. Pertinent negatives include no anxiety, blurred vision, chest pain, headaches, malaise/fatigue, neck pain, orthopnea, palpitations, peripheral edema, PND, shortness of breath or sweats.   Larisa Vazquez is also being seen today for Medicare Wellness exam. Her BP is stable. She has been Bradycardic. She is cutting back on her Metoprolol.    Review of Systems   Constitutional:  Negative for malaise/fatigue.   Eyes:  Negative for blurred vision.   Respiratory:  Negative for shortness of breath.    Cardiovascular:  Negative for chest pain, palpitations, orthopnea and PND.   Musculoskeletal:  Negative for neck pain.     Objective   Vital Signs:  /62   Pulse 51   Ht 170.2 cm (67\")   Wt 89 kg (196 lb 3 oz)   SpO2 98%   BMI 30.73 kg/m²     Physical Exam  Vitals reviewed.   Constitutional:       Appearance: Normal appearance.   HENT:      Head: Normocephalic and atraumatic.      Right Ear: Tympanic membrane, ear canal and external ear normal.      Left Ear: Tympanic membrane, ear canal and external ear normal.      Nose: Nose normal.      Mouth/Throat:      Mouth: Mucous membranes are moist.      Pharynx: Oropharynx is clear.   Cardiovascular:      Rate and Rhythm: Normal rate and regular rhythm.      Heart sounds: Normal heart sounds. No murmur heard.    No friction rub. No gallop.   Pulmonary:      Effort: Pulmonary effort is normal. No respiratory distress.      Breath sounds: Normal breath sounds. No stridor. No wheezing, rhonchi or rales.   Chest:      Chest wall: No tenderness.   Abdominal:      General: Bowel sounds are normal. There is no distension.      Palpations: Abdomen is soft. There is no mass.      Tenderness: There is no abdominal tenderness. There is no guarding or " rebound.      Hernia: No hernia is present.   Musculoskeletal:      Comments: The back is non tender on palpation. Her shoulders,elbows,wrists,hips,knees and ankles are non tender.   Skin:     General: Skin is warm and dry.   Neurological:      Mental Status: She is alert.          Common labs          3/11/2023    12:41 3/11/2023    13:05 3/12/2023    05:52 3/13/2023    05:46   Common Labs   Glucose  92  154  88    BUN  18  20  26    Creatinine  0.58  0.53  0.56    Sodium  138  137  141    Potassium  4.2  4.5  3.5    Chloride  102  103  106    Calcium  10.0  9.0  9.0    Albumin  3.9  3.8  3.8    Total Bilirubin  0.3  0.3  0.3    Alkaline Phosphatase  76  72  62    AST (SGOT)  17  24  27    ALT (SGPT)  14  24  24    WBC 8.21   7.43  10.63    Hemoglobin 13.3   12.5  11.6    Hematocrit 41.0   38.4  36.0    Platelets 262   240  230    Total Cholesterol   167     Triglycerides   66     HDL Cholesterol   64     LDL Cholesterol    90       CBC w/diff          3/11/2023    12:41 3/12/2023    05:52 3/13/2023    05:46   CBC w/Diff   WBC 8.21  7.43  10.63    RBC 4.54  4.27  3.96    Hemoglobin 13.3  12.5  11.6    Hematocrit 41.0  38.4  36.0    MCV 90.3  89.9  90.9    MCH 29.3  29.3  29.3    MCHC 32.4  32.6  32.2    RDW 12.6  12.4  12.5    Platelets 262  240  230    Neutrophil Rel % 74.5  88.5  64.0    Immature Granulocyte Rel % 0.5  0.3  0.6    Lymphocyte Rel % 15.5  10.1  26.5    Monocyte Rel % 8.6  0.8  8.5    Eosinophil Rel % 0.5  0.0  0.1    Basophil Rel % 0.4  0.3  0.3      Lipid Panel          3/12/2023    05:52   Lipid Panel   Total Cholesterol 167    Triglycerides 66    HDL Cholesterol 64    VLDL Cholesterol 13    LDL Cholesterol  90    LDL/HDL Ratio 1.40      Data reviewed : None reviewed.           Assessment and Plan   Diagnoses and all orders for this visit:    1. Medicare annual wellness visit, subsequent (Primary)    2. Primary hypertension  -     Comprehensive metabolic panel; Future  -     CBC w AUTO  Differential; Future  -     Lipid panel; Future    3. Bradycardia  -     Comprehensive metabolic panel; Future    Return to the clinic in 3 month/s.  Will contact with results as needed.  Continue weaning off of the Metoprolol.         Follow Up   No follow-ups on file.  Patient was given instructions and counseling regarding her condition or for health maintenance advice. Please see specific information pulled into the AVS if appropriate.

## 2023-09-20 ENCOUNTER — LAB (OUTPATIENT)
Dept: LAB | Facility: HOSPITAL | Age: 78
End: 2023-09-20
Payer: MEDICARE

## 2023-09-20 DIAGNOSIS — I10 PRIMARY HYPERTENSION: ICD-10-CM

## 2023-09-20 DIAGNOSIS — R00.1 BRADYCARDIA: ICD-10-CM

## 2023-09-20 LAB
ALBUMIN SERPL-MCNC: 4.3 G/DL (ref 3.5–5.2)
ALBUMIN/GLOB SERPL: 1.4 G/DL
ALP SERPL-CCNC: 81 U/L (ref 39–117)
ALT SERPL W P-5'-P-CCNC: 14 U/L (ref 1–33)
ANION GAP SERPL CALCULATED.3IONS-SCNC: 11.6 MMOL/L (ref 5–15)
AST SERPL-CCNC: 20 U/L (ref 1–32)
BASOPHILS # BLD AUTO: 0.05 10*3/MM3 (ref 0–0.2)
BASOPHILS NFR BLD AUTO: 0.6 % (ref 0–1.5)
BILIRUB SERPL-MCNC: 0.6 MG/DL (ref 0–1.2)
BUN SERPL-MCNC: 20 MG/DL (ref 8–23)
BUN/CREAT SERPL: 28.2 (ref 7–25)
CALCIUM SPEC-SCNC: 9.6 MG/DL (ref 8.6–10.5)
CHLORIDE SERPL-SCNC: 100 MMOL/L (ref 98–107)
CHOLEST SERPL-MCNC: 216 MG/DL (ref 0–200)
CO2 SERPL-SCNC: 25.4 MMOL/L (ref 22–29)
CREAT SERPL-MCNC: 0.71 MG/DL (ref 0.57–1)
DEPRECATED RDW RBC AUTO: 41.6 FL (ref 37–54)
EGFRCR SERPLBLD CKD-EPI 2021: 86.6 ML/MIN/1.73
EOSINOPHIL # BLD AUTO: 0.1 10*3/MM3 (ref 0–0.4)
EOSINOPHIL NFR BLD AUTO: 1.3 % (ref 0.3–6.2)
ERYTHROCYTE [DISTWIDTH] IN BLOOD BY AUTOMATED COUNT: 12.6 % (ref 12.3–15.4)
GLOBULIN UR ELPH-MCNC: 3 GM/DL
GLUCOSE SERPL-MCNC: 87 MG/DL (ref 65–99)
HCT VFR BLD AUTO: 40.8 % (ref 34–46.6)
HDLC SERPL-MCNC: 74 MG/DL (ref 40–60)
HGB BLD-MCNC: 13.6 G/DL (ref 12–15.9)
IMM GRANULOCYTES # BLD AUTO: 0.03 10*3/MM3 (ref 0–0.05)
IMM GRANULOCYTES NFR BLD AUTO: 0.4 % (ref 0–0.5)
LDLC SERPL CALC-MCNC: 125 MG/DL (ref 0–100)
LDLC/HDLC SERPL: 1.65 {RATIO}
LYMPHOCYTES # BLD AUTO: 1.81 10*3/MM3 (ref 0.7–3.1)
LYMPHOCYTES NFR BLD AUTO: 23.2 % (ref 19.6–45.3)
MCH RBC QN AUTO: 30.2 PG (ref 26.6–33)
MCHC RBC AUTO-ENTMCNC: 33.3 G/DL (ref 31.5–35.7)
MCV RBC AUTO: 90.7 FL (ref 79–97)
MONOCYTES # BLD AUTO: 0.6 10*3/MM3 (ref 0.1–0.9)
MONOCYTES NFR BLD AUTO: 7.7 % (ref 5–12)
NEUTROPHILS NFR BLD AUTO: 5.22 10*3/MM3 (ref 1.7–7)
NEUTROPHILS NFR BLD AUTO: 66.8 % (ref 42.7–76)
NRBC BLD AUTO-RTO: 0 /100 WBC (ref 0–0.2)
PLATELET # BLD AUTO: 264 10*3/MM3 (ref 140–450)
PMV BLD AUTO: 11.3 FL (ref 6–12)
POTASSIUM SERPL-SCNC: 4.4 MMOL/L (ref 3.5–5.2)
PROT SERPL-MCNC: 7.3 G/DL (ref 6–8.5)
RBC # BLD AUTO: 4.5 10*6/MM3 (ref 3.77–5.28)
SODIUM SERPL-SCNC: 137 MMOL/L (ref 136–145)
TRIGL SERPL-MCNC: 99 MG/DL (ref 0–150)
VLDLC SERPL-MCNC: 17 MG/DL (ref 5–40)
WBC NRBC COR # BLD: 7.81 10*3/MM3 (ref 3.4–10.8)

## 2023-09-20 PROCEDURE — 85025 COMPLETE CBC W/AUTO DIFF WBC: CPT

## 2023-09-20 PROCEDURE — 80053 COMPREHEN METABOLIC PANEL: CPT

## 2023-09-20 PROCEDURE — 80061 LIPID PANEL: CPT
